# Patient Record
Sex: FEMALE | Race: BLACK OR AFRICAN AMERICAN | NOT HISPANIC OR LATINO | Employment: FULL TIME | URBAN - METROPOLITAN AREA
[De-identification: names, ages, dates, MRNs, and addresses within clinical notes are randomized per-mention and may not be internally consistent; named-entity substitution may affect disease eponyms.]

---

## 2017-03-06 ENCOUNTER — OFFICE VISIT (OUTPATIENT)
Dept: OBSTETRICS AND GYNECOLOGY | Facility: CLINIC | Age: 27
End: 2017-03-06
Payer: COMMERCIAL

## 2017-03-06 VITALS
HEIGHT: 68 IN | DIASTOLIC BLOOD PRESSURE: 78 MMHG | WEIGHT: 228.38 LBS | SYSTOLIC BLOOD PRESSURE: 118 MMHG | BODY MASS INDEX: 34.61 KG/M2

## 2017-03-06 DIAGNOSIS — R10.31 ABDOMINAL CRAMPING, BILATERAL LOWER QUADRANT: ICD-10-CM

## 2017-03-06 DIAGNOSIS — R10.32 ABDOMINAL CRAMPING, BILATERAL LOWER QUADRANT: ICD-10-CM

## 2017-03-06 DIAGNOSIS — R10.30 ABDOMINAL PAIN, LOWER: Primary | ICD-10-CM

## 2017-03-06 PROCEDURE — 99999 PR PBB SHADOW E&M-EST. PATIENT-LVL III: CPT | Mod: PBBFAC,,, | Performed by: ADVANCED PRACTICE MIDWIFE

## 2017-03-06 PROCEDURE — 99213 OFFICE O/P EST LOW 20 MIN: CPT | Mod: S$GLB,,, | Performed by: ADVANCED PRACTICE MIDWIFE

## 2017-03-06 PROCEDURE — 1160F RVW MEDS BY RX/DR IN RCRD: CPT | Mod: S$GLB,,, | Performed by: ADVANCED PRACTICE MIDWIFE

## 2017-03-06 NOTE — PROGRESS NOTES
"Subjective:       Yee Adler is a 26 y.o. female who presents for evaluation of abdominal pain. Onset was 1 week ago worse last evening- no meds taken. This am intermittent cramping. Symptoms have been intermittent. The pain is described as cramping, sharp and cold, and is 7/10 in intensity when it occurs- no meds taken as she likes to avoid whist nursing. Pain is located in the right and central abdomen- achy- "like period coming". No localized tenderness without radiation.  Aggravating factors: none.  Alleviating factors: liquids. Associated symptoms: soft stool with isolated episode of blood in stool 3 days , resolve spontaneously. The patient denies anorexia, chills, constipation, dysuria, fever, flatus, frequency, hematuria, nausea, sweats and vomiting.      On further questioning, of note, breast feeding 8 month baby, was vegan but started meat after birth and reintroduced seafood this past month  Review of Systems  Pertinent items are noted in HPI.       Objective:      No exam performed today, N/A.    Abdomen- Soft and non-tender  Assessment:      Abdominal pain, likely secondary to reintro  Isolated episode "blood in stool" after introduction of meat/seafood in diet after being vegan .      Plan:      The diagnosis was discussed with the patient and evaluation and treatment plans outlined.  Reassured patient that symptoms are almost certainly benign and self-resolving.  Follow up as needed.    Advise to reintroduce seafood slowly and one at a time  Take probiotics  Watch for any further bloody stool and FU with GI  Pt happy with this discussion and will FU prn  "

## 2018-05-15 ENCOUNTER — PATIENT OUTREACH (OUTPATIENT)
Dept: ADMINISTRATIVE | Facility: HOSPITAL | Age: 28
End: 2018-05-15

## 2018-05-29 ENCOUNTER — PATIENT OUTREACH (OUTPATIENT)
Dept: ADMINISTRATIVE | Facility: HOSPITAL | Age: 28
End: 2018-05-29

## 2018-06-04 NOTE — PROGRESS NOTES
2nd attempt: I have attempted without success to contact pt to schedule appointment. Left voice mail.

## 2022-06-14 ENCOUNTER — TELEPHONE (OUTPATIENT)
Dept: OBSTETRICS AND GYNECOLOGY | Facility: CLINIC | Age: 32
End: 2022-06-14
Payer: COMMERCIAL

## 2022-06-14 NOTE — TELEPHONE ENCOUNTER
----- Message from Zuleyma Brownlee sent at 6/14/2022 11:55 AM CDT -----  States she would like to schedule a new pregnancy appt. Nothing coming up on the schedule. She would like to schedule w/ Ms Mounika Sofia. Please call pt [phone]. Thank you

## 2022-06-14 NOTE — TELEPHONE ENCOUNTER
Spoke with pt and assisted her with scheduling a pregnancy confirmation. Scheduled pt with Mounika Sofia on 6/28 at 10 am . Pt verbalized understanding and agreed to date, time, and location.

## 2022-06-14 NOTE — TELEPHONE ENCOUNTER
----- Message from Nella Ann sent at 6/14/2022  1:17 PM CDT -----  Regarding: rtn toby  Contact: pt  Returning call to office. Can be reached at  [phone]

## 2022-06-17 ENCOUNTER — HOSPITAL ENCOUNTER (EMERGENCY)
Facility: HOSPITAL | Age: 32
Discharge: HOME | End: 2022-06-17
Attending: EMERGENCY MEDICINE
Payer: COMMERCIAL

## 2022-06-17 ENCOUNTER — TELEPHONE (OUTPATIENT)
Dept: OBSTETRICS AND GYNECOLOGY | Facility: CLINIC | Age: 32
End: 2022-06-17
Payer: COMMERCIAL

## 2022-06-17 VITALS
OXYGEN SATURATION: 100 % | WEIGHT: 182.44 LBS | TEMPERATURE: 98 F | SYSTOLIC BLOOD PRESSURE: 115 MMHG | RESPIRATION RATE: 18 BRPM | HEART RATE: 95 BPM | BODY MASS INDEX: 27.74 KG/M2 | DIASTOLIC BLOOD PRESSURE: 86 MMHG

## 2022-06-17 DIAGNOSIS — R10.30 LOWER ABDOMINAL PAIN: ICD-10-CM

## 2022-06-17 DIAGNOSIS — O26.891 ABDOMINAL PAIN DURING PREGNANCY IN FIRST TRIMESTER: Primary | ICD-10-CM

## 2022-06-17 DIAGNOSIS — R10.9 ABDOMINAL PAIN DURING PREGNANCY IN FIRST TRIMESTER: Primary | ICD-10-CM

## 2022-06-17 LAB
AMORPH CRY URNS QL MICRO: NORMAL
BACTERIA #/AREA URNS HPF: NORMAL /HPF
BILIRUB UR QL STRIP: NEGATIVE
CLARITY UR: CLEAR
COLOR UR: YELLOW
GLUCOSE UR QL STRIP: NEGATIVE
HCG INTACT+B SERPL-ACNC: 2244 MIU/ML
HGB UR QL STRIP: NEGATIVE
KETONES UR QL STRIP: NEGATIVE
LEUKOCYTE ESTERASE UR QL STRIP: ABNORMAL
MICROSCOPIC COMMENT: NORMAL
NITRITE UR QL STRIP: NEGATIVE
PH UR STRIP: 7 [PH] (ref 5–8)
PROT UR QL STRIP: NEGATIVE
SP GR UR STRIP: <=1.005 (ref 1–1.03)
SQUAMOUS #/AREA URNS HPF: 10 /HPF
URN SPEC COLLECT METH UR: ABNORMAL
UROBILINOGEN UR STRIP-ACNC: NEGATIVE EU/DL
WBC #/AREA URNS HPF: 4 /HPF (ref 0–5)

## 2022-06-17 PROCEDURE — 84702 CHORIONIC GONADOTROPIN TEST: CPT | Performed by: EMERGENCY MEDICINE

## 2022-06-17 PROCEDURE — 99284 EMERGENCY DEPT VISIT MOD MDM: CPT | Mod: 25

## 2022-06-17 PROCEDURE — 81000 URINALYSIS NONAUTO W/SCOPE: CPT | Performed by: EMERGENCY MEDICINE

## 2022-06-17 NOTE — ED PROVIDER NOTES
"SCRIBE #1 NOTE: I, Kylah Dowell, am scribing for, and in the presence of, Elayne Frye MD. I have scribed the entire note.       History     Chief Complaint   Patient presents with    Abdominal Cramping     Reports 5 weeks pregnant and had "sharp pain in left side of stomach, radiated to neck and back down to feet". Reports x 2 nights.      Review of patient's allergies indicates:  No Known Allergies      History of Present Illness     HPI    2022, 1:11 PM  History obtained from the patient      History of Present Illness: Yee Adler is a 32 y.o. female patient with a PMHx of anemia who presents to the Emergency Department for evaluation of abdominal cramping which onset 2 days PTA. Symptoms are constant and moderate in severity. No mitigating or exacerbating factors reported. Pt is currently 5 weeks pregnant; A0. Pt states that the pain starts out as cramps on the left side, but radiates to neck and back down to feet.  No associated sxs reported. Patient denies any vaginal bleeding, fever, n/v/d, SOB, CP, HA, and all other sxs at this time. No prior Tx reported. No further complaints or concerns at this time.       Arrival mode: Personal vehicle    PCP: Primary Doctor No        Past Medical History:  Past Medical History:   Diagnosis Date    Anemia 2014       Past Surgical History:  Past Surgical History:   Procedure Laterality Date     SECTION      WISDOM TOOTH EXTRACTION           Family History:  Family History   Problem Relation Age of Onset    Stroke Maternal Grandmother     Diabetes Mother     Hypertension Mother     Breast cancer Neg Hx     Cancer Neg Hx     Colon cancer Neg Hx     Eclampsia Neg Hx     Miscarriages / Stillbirths Neg Hx     Ovarian cancer Neg Hx      labor Neg Hx        Social History:  Social History     Tobacco Use    Smoking status: Never Smoker    Smokeless tobacco: Never Used   Substance and Sexual Activity    Alcohol use: No "    Drug use: No    Sexual activity: Yes     Partners: Male     Birth control/protection: None        Review of Systems     Review of Systems   Constitutional: Negative for fever.   HENT: Negative for sore throat.    Respiratory: Negative for shortness of breath.    Cardiovascular: Negative for chest pain.   Gastrointestinal: Positive for abdominal pain (cramping). Negative for diarrhea, nausea and vomiting.   Genitourinary: Negative for dysuria and vaginal bleeding.   Musculoskeletal: Negative for back pain.   Skin: Negative for rash.   Neurological: Negative for weakness and headaches.   Hematological: Does not bruise/bleed easily.   All other systems reviewed and are negative.       Physical Exam     Initial Vitals [06/17/22 1202]   BP Pulse Resp Temp SpO2   128/73 71 16 98.1 °F (36.7 °C) 100 %      MAP       --          Physical Exam  Nursing Notes and Vital Signs Reviewed.  Constitutional: Patient is in no acute distress. Well-developed and well-nourished.  Head: Atraumatic. Normocephalic.  Eyes: PERRL. EOM intact. Conjunctivae are not pale. No scleral icterus.  ENT: Mucous membranes are moist. Oropharynx is clear and symmetric.    Neck: Supple. Full ROM. No lymphadenopathy.  Cardiovascular: Regular rate. Regular rhythm. No murmurs, rubs, or gallops. Distal pulses are 2+ and symmetric.  Pulmonary/Chest: No respiratory distress. Clear to auscultation bilaterally. No wheezing or rales.  Abdominal: Soft and non-distended.  There is no tenderness.  No rebound, guarding, or rigidity. Good bowel sounds.  Genitourinary: No CVA tenderness  Musculoskeletal: Moves all extremities. No obvious deformities. No edema. No calf tenderness.  Skin: Warm and dry.  Neurological:  Alert, awake, and appropriate.  Normal speech.  No acute focal neurological deficits are appreciated.  Psychiatric: Normal affect. Good eye contact. Appropriate in content.     ED Course   Procedures  ED Vital Signs:  Vitals:    06/17/22 1202 06/17/22  1303 06/17/22 1448   BP: 128/73 110/75 115/86   Pulse: 71 94 95   Resp: 16 18 18   Temp: 98.1 °F (36.7 °C) 98.6 °F (37 °C) 98.4 °F (36.9 °C)   TempSrc: Oral Oral Oral   SpO2: 100% 100% 100%   Weight: 82.7 kg (182 lb 6.9 oz)         Abnormal Lab Results:  Labs Reviewed   URINALYSIS, REFLEX TO URINE CULTURE - Abnormal; Notable for the following components:       Result Value    Specific Gravity, UA <=1.005 (*)     Leukocytes, UA 3+ (*)     All other components within normal limits    Narrative:     Specimen Source->Urine   HCG, QUANTITATIVE    Narrative:     Release to patient->Immediate   URINALYSIS MICROSCOPIC    Narrative:     Specimen Source->Urine        All Lab Results:  Results for orders placed or performed during the hospital encounter of 06/17/22   hCG, quantitative, pregnancy   Result Value Ref Range    HCG Quant 2244 See Text mIU/mL   Urinalysis, Reflex to Urine Culture Urine, Clean Catch    Specimen: Urine   Result Value Ref Range    Specimen UA Urine, Clean Catch     Color, UA Yellow Yellow, Straw, Yee    Appearance, UA Clear Clear    pH, UA 7.0 5.0 - 8.0    Specific Gravity, UA <=1.005 (A) 1.005 - 1.030    Protein, UA Negative Negative    Glucose, UA Negative Negative    Ketones, UA Negative Negative    Bilirubin (UA) Negative Negative    Occult Blood UA Negative Negative    Nitrite, UA Negative Negative    Urobilinogen, UA Negative <2.0 EU/dL    Leukocytes, UA 3+ (A) Negative   Urinalysis Microscopic   Result Value Ref Range    WBC, UA 4 0 - 5 /hpf    Bacteria Occasional None-Occ /hpf    Squam Epithel, UA 10 /hpf    Amorphous, UA Few None-Moderate    Microscopic Comment SEE COMMENT        Imaging Results:  Imaging Results          US OB <14 Wks TransAbd & TransVag, Single Gestation (XPD) (Final result)  Result time 06/17/22 15:39:29   Procedure changed from US OB <14 Wks, TransAbd, Single Gestation     Final result by Chris Emerson MD (06/17/22 15:39:29)                 Impression:      Suspect  early IUP which is below threshold for detection.  No fetal pole identified at this time.  Recommend follow-up ultrasound and serial beta HCG.      Electronically signed by: Chris Emerson MD  Date:    06/17/2022  Time:    15:39             Narrative:    EXAMINATION:  US OB <14 WEEKS, TRANSABDOM & TRANSVAG, SINGLE GESTATION (XPD)    CLINICAL HISTORY:  Lower abdominal pain, unspecifiedpain LLQ;    COMPARISON:  None    FINDINGS:  Beta HCG 2244    The uterus demonstrates a gestational sac with a mean sac diameter of 5.1 mm.  No fetal pole.  Cervix is closed.    The uterus measures 8.3 x 4.8 x 6.6 cm.    Right ovary measures screw 3.6 x 2.5 x 3 cm and is unremarkable.  Left ovary measures 3.4 x 1.2 x 2.4 cm and is unremarkable.                                         The Emergency Provider reviewed the vital signs and test results, which are outlined above.     ED Discussion       3:37 PM: Reassessed pt at this time. Discussed with pt all pertinent ED information and results. Discussed pt dx and plan of tx. Gave pt all f/u and return to the ED instructions. All questions and concerns were addressed at this time. Pt expresses understanding of information and instructions, and is comfortable with plan to discharge. Pt is stable for discharge.    I discussed with patient and/or family/caretaker that evaluation in the ED does not suggest any emergent or life threatening medical conditions requiring immediate intervention beyond what was provided in the ED, and I believe patient is safe for discharge.  Regardless, an unremarkable evaluation in the ED does not preclude the development or presence of a serious of life threatening condition. As such, patient was instructed to return immediately for any worsening or change in current symptoms.       Medical Decision Making:   Clinical Tests:   Lab Tests: Ordered and Reviewed  Radiological Study: Ordered and Reviewed           ED Medication(s):  Medications - No data to  display    Discharge Medication List as of 6/17/2022  3:37 PM           Follow-up Information     [facility]. Schedule an appointment as soon as possible for a visit in 2 days.    Specialty: Obstetrics and Gynecology  Why: Return to the Emergency Room, If symptoms worsen  Contact information:  [address] [facility] [address] [phone]  Additional information:  Please take [address] for the Medical Office Building. Check in on the 3rd floor, to the right.                           Scribe Attestation:   Scribe #1: I performed the above scribed service and the documentation accurately describes the services I performed. I attest to the accuracy of the note.     Attending:   Physician Attestation Statement for Scribe #1: I, Elayne Frye MD, personally performed the services described in this documentation, as scribed by Kylah Dowell, in my presence, and it is both accurate and complete.           Clinical Impression       ICD-10-CM ICD-9-CM   1. Abdominal pain during pregnancy in first trimester  O26.891 646.83    R10.9 789.00   2. Lower abdominal pain  R10.30 789.09       Disposition:   Disposition: Discharged  Condition: Stable       Elayne Frye MD  06/17/22 8010

## 2022-06-17 NOTE — TELEPHONE ENCOUNTER
Returned call to pt who states she's experiencing    R sided (sharp) pains that migrates to her neck, along with anal pain. Pt says she's 5 weeks pregnant and very concerned. Pt drinks 70 oz of water a day. Pt requesting a visit. Advised to report to ER for further evaluation and rule out ectopic. Pt was able to speak with NPTRACE who provided recommendations for comfort but also suggested to visit the ER to be safe because her pregnancy has not been confirmed.       Pt voice understanding.

## 2022-06-17 NOTE — TELEPHONE ENCOUNTER
----- Message from Josefina Cuadra sent at 6/17/2022 10:57 AM CDT -----  Contact: Oli Fraire@ [phone]  Pt calling to see if she able to take the 1 pm appointment today with the NP provider? Please call to advise.

## 2022-07-06 ENCOUNTER — OFFICE VISIT (OUTPATIENT)
Dept: OBSTETRICS AND GYNECOLOGY | Facility: CLINIC | Age: 32
End: 2022-07-06
Payer: COMMERCIAL

## 2022-07-06 ENCOUNTER — LAB VISIT (OUTPATIENT)
Dept: LAB | Facility: HOSPITAL | Age: 32
End: 2022-07-06
Attending: ADVANCED PRACTICE MIDWIFE
Payer: COMMERCIAL

## 2022-07-06 VITALS
SYSTOLIC BLOOD PRESSURE: 116 MMHG | HEIGHT: 68 IN | DIASTOLIC BLOOD PRESSURE: 60 MMHG | BODY MASS INDEX: 27.92 KG/M2 | WEIGHT: 184.19 LBS

## 2022-07-06 DIAGNOSIS — Z34.80 SUPERVISION OF OTHER NORMAL PREGNANCY: ICD-10-CM

## 2022-07-06 DIAGNOSIS — Z34.80 SUPERVISION OF OTHER NORMAL PREGNANCY: Primary | ICD-10-CM

## 2022-07-06 LAB
BACTERIA #/AREA URNS AUTO: NORMAL /HPF
BASOPHILS # BLD AUTO: 0.02 K/UL (ref 0–0.2)
BASOPHILS NFR BLD: 0.4 % (ref 0–1.9)
BILIRUB UR QL STRIP: NEGATIVE
CLARITY UR REFRACT.AUTO: ABNORMAL
COLOR UR AUTO: YELLOW
DIFFERENTIAL METHOD: ABNORMAL
EOSINOPHIL # BLD AUTO: 0 K/UL (ref 0–0.5)
EOSINOPHIL NFR BLD: 0.6 % (ref 0–8)
ERYTHROCYTE [DISTWIDTH] IN BLOOD BY AUTOMATED COUNT: 15.3 % (ref 11.5–14.5)
GLUCOSE UR QL STRIP: NEGATIVE
HCT VFR BLD AUTO: 33.1 % (ref 37–48.5)
HGB BLD-MCNC: 10.3 G/DL (ref 12–16)
HGB UR QL STRIP: NEGATIVE
IMM GRANULOCYTES # BLD AUTO: 0.01 K/UL (ref 0–0.04)
IMM GRANULOCYTES NFR BLD AUTO: 0.2 % (ref 0–0.5)
KETONES UR QL STRIP: NEGATIVE
LEUKOCYTE ESTERASE UR QL STRIP: ABNORMAL
LYMPHOCYTES # BLD AUTO: 1.4 K/UL (ref 1–4.8)
LYMPHOCYTES NFR BLD: 28.5 % (ref 18–48)
MCH RBC QN AUTO: 23.4 PG (ref 27–31)
MCHC RBC AUTO-ENTMCNC: 31.1 G/DL (ref 32–36)
MCV RBC AUTO: 75 FL (ref 82–98)
MICROSCOPIC COMMENT: NORMAL
MONOCYTES # BLD AUTO: 0.3 K/UL (ref 0.3–1)
MONOCYTES NFR BLD: 5.8 % (ref 4–15)
NEUTROPHILS # BLD AUTO: 3.2 K/UL (ref 1.8–7.7)
NEUTROPHILS NFR BLD: 64.5 % (ref 38–73)
NITRITE UR QL STRIP: NEGATIVE
NRBC BLD-RTO: 0 /100 WBC
PH UR STRIP: 8 [PH] (ref 5–8)
PLATELET # BLD AUTO: 283 K/UL (ref 150–450)
PMV BLD AUTO: 11.2 FL (ref 9.2–12.9)
PROT UR QL STRIP: NEGATIVE
RBC # BLD AUTO: 4.4 M/UL (ref 4–5.4)
RBC #/AREA URNS AUTO: 4 /HPF (ref 0–4)
SP GR UR STRIP: 1 (ref 1–1.03)
SQUAMOUS #/AREA URNS AUTO: 14 /HPF
URN SPEC COLLECT METH UR: ABNORMAL
WBC # BLD AUTO: 5.02 K/UL (ref 3.9–12.7)
WBC #/AREA URNS AUTO: 2 /HPF (ref 0–5)

## 2022-07-06 PROCEDURE — 86592 SYPHILIS TEST NON-TREP QUAL: CPT | Performed by: ADVANCED PRACTICE MIDWIFE

## 2022-07-06 PROCEDURE — 99203 PR OFFICE/OUTPT VISIT, NEW, LEVL III, 30-44 MIN: ICD-10-PCS | Mod: S$GLB,,, | Performed by: ADVANCED PRACTICE MIDWIFE

## 2022-07-06 PROCEDURE — 1159F PR MEDICATION LIST DOCUMENTED IN MEDICAL RECORD: ICD-10-PCS | Mod: CPTII,S$GLB,, | Performed by: ADVANCED PRACTICE MIDWIFE

## 2022-07-06 PROCEDURE — 99999 PR PBB SHADOW E&M-EST. PATIENT-LVL III: ICD-10-PCS | Mod: PBBFAC,,, | Performed by: ADVANCED PRACTICE MIDWIFE

## 2022-07-06 PROCEDURE — 99203 OFFICE O/P NEW LOW 30 MIN: CPT | Mod: S$GLB,,, | Performed by: ADVANCED PRACTICE MIDWIFE

## 2022-07-06 PROCEDURE — 3078F DIAST BP <80 MM HG: CPT | Mod: CPTII,S$GLB,, | Performed by: ADVANCED PRACTICE MIDWIFE

## 2022-07-06 PROCEDURE — 3008F BODY MASS INDEX DOCD: CPT | Mod: CPTII,S$GLB,, | Performed by: ADVANCED PRACTICE MIDWIFE

## 2022-07-06 PROCEDURE — 86762 RUBELLA ANTIBODY: CPT | Performed by: ADVANCED PRACTICE MIDWIFE

## 2022-07-06 PROCEDURE — 99999 PR PBB SHADOW E&M-EST. PATIENT-LVL III: CPT | Mod: PBBFAC,,, | Performed by: ADVANCED PRACTICE MIDWIFE

## 2022-07-06 PROCEDURE — 3078F PR MOST RECENT DIASTOLIC BLOOD PRESSURE < 80 MM HG: ICD-10-PCS | Mod: CPTII,S$GLB,, | Performed by: ADVANCED PRACTICE MIDWIFE

## 2022-07-06 PROCEDURE — 87624 HPV HI-RISK TYP POOLED RSLT: CPT | Performed by: ADVANCED PRACTICE MIDWIFE

## 2022-07-06 PROCEDURE — 87491 CHLMYD TRACH DNA AMP PROBE: CPT | Performed by: ADVANCED PRACTICE MIDWIFE

## 2022-07-06 PROCEDURE — 87389 HIV-1 AG W/HIV-1&-2 AB AG IA: CPT | Performed by: ADVANCED PRACTICE MIDWIFE

## 2022-07-06 PROCEDURE — 88175 CYTOPATH C/V AUTO FLUID REDO: CPT | Performed by: ADVANCED PRACTICE MIDWIFE

## 2022-07-06 PROCEDURE — 1159F MED LIST DOCD IN RCRD: CPT | Mod: CPTII,S$GLB,, | Performed by: ADVANCED PRACTICE MIDWIFE

## 2022-07-06 PROCEDURE — 36415 COLL VENOUS BLD VENIPUNCTURE: CPT | Mod: PN | Performed by: ADVANCED PRACTICE MIDWIFE

## 2022-07-06 PROCEDURE — 3008F PR BODY MASS INDEX (BMI) DOCUMENTED: ICD-10-PCS | Mod: CPTII,S$GLB,, | Performed by: ADVANCED PRACTICE MIDWIFE

## 2022-07-06 PROCEDURE — 81001 URINALYSIS AUTO W/SCOPE: CPT | Performed by: ADVANCED PRACTICE MIDWIFE

## 2022-07-06 PROCEDURE — 86901 BLOOD TYPING SEROLOGIC RH(D): CPT | Performed by: ADVANCED PRACTICE MIDWIFE

## 2022-07-06 PROCEDURE — 87591 N.GONORRHOEAE DNA AMP PROB: CPT | Performed by: ADVANCED PRACTICE MIDWIFE

## 2022-07-06 PROCEDURE — 85025 COMPLETE CBC W/AUTO DIFF WBC: CPT | Performed by: ADVANCED PRACTICE MIDWIFE

## 2022-07-06 PROCEDURE — 3074F PR MOST RECENT SYSTOLIC BLOOD PRESSURE < 130 MM HG: ICD-10-PCS | Mod: CPTII,S$GLB,, | Performed by: ADVANCED PRACTICE MIDWIFE

## 2022-07-06 PROCEDURE — 80074 ACUTE HEPATITIS PANEL: CPT | Performed by: ADVANCED PRACTICE MIDWIFE

## 2022-07-06 PROCEDURE — 3074F SYST BP LT 130 MM HG: CPT | Mod: CPTII,S$GLB,, | Performed by: ADVANCED PRACTICE MIDWIFE

## 2022-07-06 NOTE — PROGRESS NOTES
"CC: Absence of menses risk assessment Mounika Sofia 2022    Yee Adler is a 32 y.o. female  presents with complaint of absence of menstruation.  She reports nausea UPT is positive.    *  LMP 2022, EDC 2023, therefore 7 weeks 1 day    Past Medical History:   Diagnosis Date    Anemia 2014     Past Surgical History:   Procedure Laterality Date     SECTION      WISDOM TOOTH EXTRACTION       Social History     Socioeconomic History    Marital status: Single   Tobacco Use    Smoking status: Never Smoker    Smokeless tobacco: Never Used   Substance and Sexual Activity    Alcohol use: No    Drug use: No    Sexual activity: Yes     Partners: Male     Birth control/protection: None     Family History   Problem Relation Age of Onset    Stroke Maternal Grandmother     Diabetes Mother     Hypertension Mother     Breast cancer Neg Hx     Cancer Neg Hx     Colon cancer Neg Hx     Eclampsia Neg Hx     Miscarriages / Stillbirths Neg Hx     Ovarian cancer Neg Hx      labor Neg Hx      OB History    Para Term  AB Living   3 2 2     2   SAB IAB Ectopic Multiple Live Births         0 2      # Outcome Date GA Lbr Jose G/2nd Weight Sex Delivery Anes PTL Lv   3 Current            2 Term 16 41w3d  3.13 kg (6 lb 14.4 oz) M Vag-Spont None N NILO   1 Term 10/06/14 41w0d  3.419 kg (7 lb 8.6 oz) M CS-LTranv Spinal N NILO       /60   Ht 5' 8" (1.727 m)   Wt 83.6 kg (184 lb 3.1 oz)   LMP 2022 (Exact Date)   BMI 28.01 kg/m²         ROS:  GENERAL: Denies weight gain or weight loss. Feeling well overall.   SKIN: Denies rash or lesions.   HEAD: Denies head injury or headache.   NODES: Denies enlarged lymph nodes.   CHEST: Denies chest pain or shortness of breath.   CARDIOVASCULAR: Denies palpitations or left sided chest pain.   ABDOMEN: No abdominal pain, constipation, diarrhea, nausea, vomiting or rectal bleeding.   URINARY: No frequency, dysuria, " hematuria, or burning on urination.  REPRODUCTIVE: See HPI.   BREASTS: The patient performs breast self-examination and denies pain, lumps, or nipple discharge.   HEMATOLOGIC: No easy bruisability or excessive bleeding.   MUSCULOSKELETAL: Denies joint pain or swelling.   NEUROLOGIC: Denies syncope or weakness.   PSYCHIATRIC: Denies depression, anxiety or mood swings.    PE:   APPEARANCE: Well nourished, well developed, in no acute distress.  AFFECT: WNL, alert and oriented x 3.  SKIN: No acne or hirsutism.  NECK: Neck symmetric without masses or thyromegaly.  NODES: No inguinal, cervical, axillary or femoral lymph node enlargement.  CHEST: Good respiratory effort.   ABDOMEN: Soft. No tenderness or masses. No hepatosplenomegaly. No hernias.  BREASTS: Symmetrical, no skin changes or visible lesions. No palpable masses, nipple discharge bilaterally.  PELVIC: Normal external female genitalia without lesions. Normal hair distribution. Adequate perineal body, normal urethral meatus. Vagina moist and well rugated without lesions or discharge. Cervix pink, without lesions, discharge or tenderness. No significant cystocele or rectocele. Bimanual exam shows uterus is 8 weeks, regular, mobile and nontender. Adnexa without masses or tenderness.  EXTREMITIES: No edema.          ASSESSMENT and PLAN:  32-year-old  3 para 2 with secondary amenorrhea and positive UPT.  LMP 2022 yielded EDC 2023, therefore 7 weeks 1 day.  Prenatal lab today.  GC chlamydia and Pap today.  Taking prenatal vitamins.  Secondary to left-sided pain on 2022, went to ED and had an ultrasound to rule out ectopic.  Early IUP with gestational sac, no fetal pole.  Ovaries adnexa unremarkable.  Ectopic ruled out.  Beta hCG 2244.  Dating ultrasound and new OB 1 week.  First trimester expectations-information provided.  Miscarriage precautions reviewed.    ICD-10-CM ICD-9-CM    1. Supervision of other normal pregnancy  Z34.80 V22.1 HIV 1/2  Ag/Ab (4th Gen)      RPR      Rubella Antibody, IgG      Type & Screen      CBC Auto Differential      C. trachomatis/N. gonorrhoeae by AMP DNA      Liquid-Based Pap Smear, Screening      HPV High Risk Genotypes, PCR      Hepatitis Panel, Acute      Urinalysis, Reflex to Urine Culture Urine, Clean Catch      US OB/GYN Procedure ([facility])         Patient was counseled today on proper weight gain based on the [place] [employer]'s recommendations based on her pre-pregnancy weight. Discussed foods to avoid in pregnancy (i.e. sushi, fish that are high in mercury, deli meat, and unpasteurized cheeses). Discussed prenatal vitamin options (i.e. stool softener, DHA). Contingency screen offered - patient desires.    No follow-ups on file.

## 2022-07-06 NOTE — PATIENT INSTRUCTIONS
Patient Education       Pregnancy - The Second Month   About this topic   It is important for you to learn how to take care of yourself to help you have a healthy baby and safe delivery. It is good to have health care throughout your pregnancy.  The second month of your pregnancy starts around week 5 and lasts through week 9. By knowing how far along you are, you can learn what is normal for this stage of your pregnancy and plan for what is next.  General   Your Body   During the second month of your pregnancy, here are some things you can expect.  There are more hormones active in your body and because of them, you may:  Have morning sickness or have an upset stomach at other times throughout the day  Feel just a little tired or need long naps each day  Go to the bathroom more often, even at night  Have tender or swollen breasts  Feel more emotional  Have color changes in nipples and areola  Have a brownish color over your forehead, temples, cheeks, and/or lips. This is melasma.  Your babys growth and development:  Your baby starts to develop organs like the brain, heart, liver, and lungs. Arms, legs, eyes, and ears are all starting to grow.  The heart beats about 150 times each minute by 6 weeks. Your doctor may be able to hear the heartbeat with a special tool by the end of this month.  Your baby has facial features, moves, and wiggles, but you won't be able to feel anything yet.  Your baby is about 1 inch (2.5 cm) long and is about the size of a blueberry.  Things to Think About   Avoid alcohol, drugs, tobacco products, and second hand smoke  Check with your doctor before taking any kind of drugs.  Ask about taking a vitamin. Take at least 400 micrograms of folic acid each day to help prevent some birth defects.  Making appointments with the doctor who will take care of you and your baby while you are pregnant.  Learn about what kinds of screening tests are offered while you are pregnant.  Is there anything I  need to do more or less of while I am pregnant?  Learn about which over the counter products are safe to use and take while you are pregnant.  Be sure to eat fresh, healthy foods while you are pregnant. Learn what foods are more likely to make you sick or have things that could harm your baby.  When do I need to call the doctor?   Not able to stop throwing up  Belly pain or cramps that keeps you from eating or sleeping  Period-like bleeding  Where can I learn more?   American Academy of Family Physicians  https://familydoctor.org/changes-in-your-body-during-pregnancy-first-trimester/   American Pregnancy Association  https://americanpregnancy.org/planning/first-prenatal-visit/   Last Reviewed Date   2020-04-20  Consumer Information Use and Disclaimer   This information is not specific medical advice and does not replace information you receive from your health care provider. This is only a brief summary of general information. It does NOT include all information about conditions, illnesses, injuries, tests, procedures, treatments, therapies, discharge instructions or life-style choices that may apply to you. You must talk with your health care provider for complete information about your health and treatment options. This information should not be used to decide whether or not to accept your health care providers advice, instructions or recommendations. Only your health care provider has the knowledge and training to provide advice that is right for you.  Copyright   Copyright © 2021 [employer] and its affiliates and/or licensors. All rights reserved.

## 2022-07-07 LAB
ABO + RH BLD: NORMAL
BLD GP AB SCN CELLS X3 SERPL QL: NORMAL
HAV IGM SERPL QL IA: NEGATIVE
HBV CORE IGM SERPL QL IA: NEGATIVE
HBV SURFACE AG SERPL QL IA: NEGATIVE
HCV AB SERPL QL IA: NEGATIVE
HIV 1+2 AB+HIV1 P24 AG SERPL QL IA: NEGATIVE
RPR SER QL: NORMAL
RUBV IGG SER-ACNC: 31.1 IU/ML
RUBV IGG SER-IMP: REACTIVE

## 2022-07-09 LAB
C TRACH DNA SPEC QL NAA+PROBE: NOT DETECTED
N GONORRHOEA DNA SPEC QL NAA+PROBE: NOT DETECTED

## 2022-07-12 ENCOUNTER — INITIAL PRENATAL (OUTPATIENT)
Dept: OBSTETRICS AND GYNECOLOGY | Facility: CLINIC | Age: 32
End: 2022-07-12
Payer: COMMERCIAL

## 2022-07-12 ENCOUNTER — PROCEDURE VISIT (OUTPATIENT)
Dept: OBSTETRICS AND GYNECOLOGY | Facility: CLINIC | Age: 32
End: 2022-07-12
Payer: COMMERCIAL

## 2022-07-12 VITALS
BODY MASS INDEX: 28.19 KG/M2 | DIASTOLIC BLOOD PRESSURE: 74 MMHG | WEIGHT: 185.44 LBS | SYSTOLIC BLOOD PRESSURE: 114 MMHG

## 2022-07-12 DIAGNOSIS — Z98.891 HISTORY OF VBAC: ICD-10-CM

## 2022-07-12 DIAGNOSIS — Z34.80 SUPERVISION OF OTHER NORMAL PREGNANCY: Primary | ICD-10-CM

## 2022-07-12 DIAGNOSIS — Z34.80 SUPERVISION OF OTHER NORMAL PREGNANCY: ICD-10-CM

## 2022-07-12 LAB
FINAL PATHOLOGIC DIAGNOSIS: NORMAL
Lab: NORMAL

## 2022-07-12 PROCEDURE — 76817 TRANSVAGINAL US OBSTETRIC: CPT | Mod: S$GLB,,, | Performed by: OBSTETRICS & GYNECOLOGY

## 2022-07-12 PROCEDURE — 99999 PR PBB SHADOW E&M-EST. PATIENT-LVL III: ICD-10-PCS | Mod: PBBFAC,,, | Performed by: ADVANCED PRACTICE MIDWIFE

## 2022-07-12 PROCEDURE — 99999 PR PBB SHADOW E&M-EST. PATIENT-LVL III: CPT | Mod: PBBFAC,,, | Performed by: ADVANCED PRACTICE MIDWIFE

## 2022-07-12 PROCEDURE — 0500F INITIAL PRENATAL CARE VISIT: CPT | Mod: S$GLB,,, | Performed by: ADVANCED PRACTICE MIDWIFE

## 2022-07-12 PROCEDURE — 0500F PR INITIAL PRENATAL CARE VISIT: ICD-10-PCS | Mod: S$GLB,,, | Performed by: ADVANCED PRACTICE MIDWIFE

## 2022-07-12 PROCEDURE — 76817 US OB/GYN PROCEDURE (VIEWPOINT): ICD-10-PCS | Mod: S$GLB,,, | Performed by: OBSTETRICS & GYNECOLOGY

## 2022-07-12 NOTE — PATIENT INSTRUCTIONS
Patient Education       Pregnancy - The Second Month   About this topic   It is important for you to learn how to take care of yourself to help you have a healthy baby and safe delivery. It is good to have health care throughout your pregnancy.  The second month of your pregnancy starts around week 5 and lasts through week 9. By knowing how far along you are, you can learn what is normal for this stage of your pregnancy and plan for what is next.  General   Your Body   During the second month of your pregnancy, here are some things you can expect.  There are more hormones active in your body and because of them, you may:  Have morning sickness or have an upset stomach at other times throughout the day  Feel just a little tired or need long naps each day  Go to the bathroom more often, even at night  Have tender or swollen breasts  Feel more emotional  Have color changes in nipples and areola  Have a brownish color over your forehead, temples, cheeks, and/or lips. This is melasma.  Your babys growth and development:  Your baby starts to develop organs like the brain, heart, liver, and lungs. Arms, legs, eyes, and ears are all starting to grow.  The heart beats about 150 times each minute by 6 weeks. Your doctor may be able to hear the heartbeat with a special tool by the end of this month.  Your baby has facial features, moves, and wiggles, but you won't be able to feel anything yet.  Your baby is about 1 inch (2.5 cm) long and is about the size of a blueberry.  Things to Think About   Avoid alcohol, drugs, tobacco products, and second hand smoke  Check with your doctor before taking any kind of drugs.  Ask about taking a vitamin. Take at least 400 micrograms of folic acid each day to help prevent some birth defects.  Making appointments with the doctor who will take care of you and your baby while you are pregnant.  Learn about what kinds of screening tests are offered while you are pregnant.  Is there anything I  need to do more or less of while I am pregnant?  Learn about which over the counter products are safe to use and take while you are pregnant.  Be sure to eat fresh, healthy foods while you are pregnant. Learn what foods are more likely to make you sick or have things that could harm your baby.  When do I need to call the doctor?   Not able to stop throwing up  Belly pain or cramps that keeps you from eating or sleeping  Period-like bleeding  Where can I learn more?   American Academy of Family Physicians  https://familydoctor.org/changes-in-your-body-during-pregnancy-first-trimester/   American Pregnancy Association  https://americanpregnancy.org/planning/first-prenatal-visit/   Last Reviewed Date   2020-04-20  Consumer Information Use and Disclaimer   This information is not specific medical advice and does not replace information you receive from your health care provider. This is only a brief summary of general information. It does NOT include all information about conditions, illnesses, injuries, tests, procedures, treatments, therapies, discharge instructions or life-style choices that may apply to you. You must talk with your health care provider for complete information about your health and treatment options. This information should not be used to decide whether or not to accept your health care providers advice, instructions or recommendations. Only your health care provider has the knowledge and training to provide advice that is right for you.  Copyright   Copyright © 2021 [employer] and its affiliates and/or licensors. All rights reserved.

## 2022-07-13 LAB
HPV HR 12 DNA SPEC QL NAA+PROBE: NEGATIVE
HPV16 AG SPEC QL: NEGATIVE
HPV18 DNA SPEC QL NAA+PROBE: NEGATIVE

## 2022-07-13 NOTE — PROGRESS NOTES
32 y.o. female  at 8w2d   denies VB or cramping  New OB, consent signed.  Ultrasound today reveals single viable intrauterine pregnancy 8 weeks 1 day yielding EDC 2023-reassuring  History of  section  with successful  in , desires   Doing well without concerns   First trimester s/s improving:    Reviewed prenatal labs  Genetic testing considering options    Reviewed warning signs, pregnancy precautions and how/when to call.  RTC x 4 wks, call or present sooner prn.     I spent a total of 20 minutes on the day of the visit.This includes face to face time and non-face to face time preparing to see the patient (eg, review of tests), Obtaining and/or reviewing separately obtained history, Documenting clinical information in the electronic or other health record, Independently interpreting resultsand communicating results to the patient/family/caregiver, or Care coordination.       CC: Absence of menses risk assessment Mounika Sofia 2022    Yee Adler is a 32 y.o. female  presents with complaint of absence of menstruation.  She reports nausea UPT is positive.    *  LMP 2022, EDC 2023, therefore 7 weeks 1 day    Past Medical History:   Diagnosis Date    Anemia 2014     Past Surgical History:   Procedure Laterality Date     SECTION      WISDOM TOOTH EXTRACTION       Social History     Socioeconomic History    Marital status: Single   Tobacco Use    Smoking status: Never Smoker    Smokeless tobacco: Never Used   Substance and Sexual Activity    Alcohol use: No    Drug use: No    Sexual activity: Yes     Partners: Male     Birth control/protection: None     Family History   Problem Relation Age of Onset    Stroke Maternal Grandmother     Diabetes Mother     Hypertension Mother     Breast cancer Neg Hx     Cancer Neg Hx     Colon cancer Neg Hx     Eclampsia Neg Hx     Miscarriages / Stillbirths Neg Hx     Ovarian cancer  Neg Hx      labor Neg Hx      OB History    Para Term  AB Living   3 2 2     2   SAB IAB Ectopic Multiple Live Births         0 2      # Outcome Date GA Lbr Jose G/2nd Weight Sex Delivery Anes PTL Lv   3 Current            2 Term 16 41w3d  3.13 kg (6 lb 14.4 oz) M Vag-Spont None N NILO   1 Term 10/06/14 41w0d  3.419 kg (7 lb 8.6 oz) M CS-LTranv Spinal N NILO       /74   Wt 84.1 kg (185 lb 6.5 oz)   LMP 2022 (Exact Date)   BMI 28.19 kg/m²         ROS:  GENERAL: Denies weight gain or weight loss. Feeling well overall.   SKIN: Denies rash or lesions.   HEAD: Denies head injury or headache.   NODES: Denies enlarged lymph nodes.   CHEST: Denies chest pain or shortness of breath.   CARDIOVASCULAR: Denies palpitations or left sided chest pain.   ABDOMEN: No abdominal pain, constipation, diarrhea, nausea, vomiting or rectal bleeding.   URINARY: No frequency, dysuria, hematuria, or burning on urination.  REPRODUCTIVE: See HPI.   BREASTS: The patient performs breast self-examination and denies pain, lumps, or nipple discharge.   HEMATOLOGIC: No easy bruisability or excessive bleeding.   MUSCULOSKELETAL: Denies joint pain or swelling.   NEUROLOGIC: Denies syncope or weakness.   PSYCHIATRIC: Denies depression, anxiety or mood swings.    PE:   APPEARANCE: Well nourished, well developed, in no acute distress.  AFFECT: WNL, alert and oriented x 3.  SKIN: No acne or hirsutism.  NECK: Neck symmetric without masses or thyromegaly.  NODES: No inguinal, cervical, axillary or femoral lymph node enlargement.  CHEST: Good respiratory effort.   ABDOMEN: Soft. No tenderness or masses. No hepatosplenomegaly. No hernias.  BREASTS: Symmetrical, no skin changes or visible lesions. No palpable masses, nipple discharge bilaterally.  PELVIC: Normal external female genitalia without lesions. Normal hair distribution. Adequate perineal body, normal urethral meatus. Vagina moist and well rugated without lesions  or discharge. Cervix pink, without lesions, discharge or tenderness. No significant cystocele or rectocele. Bimanual exam shows uterus is 8 weeks, regular, mobile and nontender. Adnexa without masses or tenderness.  EXTREMITIES: No edema.          ASSESSMENT and PLAN:  32-year-old  3 para 2 with secondary amenorrhea and positive UPT.  LMP 2022 yielded EDC 2023, therefore 7 weeks 1 day.  Prenatal lab today.  GC chlamydia and Pap today.  Taking prenatal vitamins.  Secondary to left-sided pain on 2022, went to ED and had an ultrasound to rule out ectopic.  Early IUP with gestational sac, no fetal pole.  Ovaries adnexa unremarkable.  Ectopic ruled out.  Beta hCG 2244.  Dating ultrasound and new OB 1 week.  First trimester expectations-information provided.  Miscarriage precautions reviewed.    ICD-10-CM ICD-9-CM    1. History of   Z98.891 V13.29          Patient was counseled today on proper weight gain based on the [place] [employer]'s recommendations based on her pre-pregnancy weight. Discussed foods to avoid in pregnancy (i.e. sushi, fish that are high in mercury, deli meat, and unpasteurized cheeses). Discussed prenatal vitamin options (i.e. stool softener, DHA). Contingency screen offered - patient desires.    No follow-ups on file.

## 2022-08-08 ENCOUNTER — PATIENT MESSAGE (OUTPATIENT)
Dept: ADMINISTRATIVE | Facility: OTHER | Age: 32
End: 2022-08-08
Payer: COMMERCIAL

## 2022-08-23 ENCOUNTER — ROUTINE PRENATAL (OUTPATIENT)
Dept: OBSTETRICS AND GYNECOLOGY | Facility: CLINIC | Age: 32
End: 2022-08-23
Payer: COMMERCIAL

## 2022-08-23 VITALS
BODY MASS INDEX: 29.08 KG/M2 | SYSTOLIC BLOOD PRESSURE: 126 MMHG | WEIGHT: 191.25 LBS | DIASTOLIC BLOOD PRESSURE: 72 MMHG

## 2022-08-23 DIAGNOSIS — Z98.891 HISTORY OF VBAC: ICD-10-CM

## 2022-08-23 DIAGNOSIS — Z34.80 SUPERVISION OF OTHER NORMAL PREGNANCY: Primary | ICD-10-CM

## 2022-08-23 PROCEDURE — 0502F SUBSEQUENT PRENATAL CARE: CPT | Mod: CPTII,S$GLB,, | Performed by: ADVANCED PRACTICE MIDWIFE

## 2022-08-23 PROCEDURE — 99999 PR PBB SHADOW E&M-EST. PATIENT-LVL III: ICD-10-PCS | Mod: PBBFAC,,, | Performed by: ADVANCED PRACTICE MIDWIFE

## 2022-08-23 PROCEDURE — 0502F PR SUBSEQUENT PRENATAL CARE: ICD-10-PCS | Mod: CPTII,S$GLB,, | Performed by: ADVANCED PRACTICE MIDWIFE

## 2022-08-23 PROCEDURE — 99999 PR PBB SHADOW E&M-EST. PATIENT-LVL III: CPT | Mod: PBBFAC,,, | Performed by: ADVANCED PRACTICE MIDWIFE

## 2022-08-23 RX ORDER — LANOLIN ALCOHOL/MO/W.PET/CERES
1 CREAM (GRAM) TOPICAL
COMMUNITY
End: 2022-09-19

## 2022-08-23 NOTE — PROGRESS NOTES
32 y.o. female  at 14w1d   denies VB or cramping    Doing well without concerns   First trimester s/s improving:     TW lbs   Reviewed prenatal labs  Genetic testing declined      Reviewed warning signs, pregnancy precautions and how/when to call.  RTC x 4 wks, call or present sooner prn.     I spent a total of 20 minutes on the day of the visit.This includes face to face time and non-face to face time preparing to see the patient (eg, review of tests), Obtaining and/or reviewing separately obtained history, Documenting clinical information in the electronic or other health record, Independently interpreting resultsand communicating results to the patient/family/caregiver, or Care coordination.

## 2022-08-23 NOTE — PATIENT INSTRUCTIONS
Patient Education       Pregnancy - The Third Month   About this topic   It is important for you to learn how to take care of yourself to help you have a healthy baby and safe delivery. It is good to have health care throughout your pregnancy.  The third month of your pregnancy starts around week 10 and lasts through week 13. By knowing how far along you are, you can learn what is normal for this stage of your pregnancy and plan for what is next.  General   Your Body   During the third month of your pregnancy, here are some things you can expect.  You may:  Have less morning sickness or upset stomach. This is because the placenta has taken over some of the hormone production for the baby.  Start to gain weight. It is normal to gain about 1 to 3 pounds (.5 to 1.5 kg) in your first 3 months. Most moms gain about 25 to 35 pounds (11 to 16 kg) during their pregnancy. Talk to your doctor about how much weight you should gain.  Have glowing skin because of extra blood flow and hormones  Notice a dark line on your belly. This is normal. You may also be able to feel your uterus in your belly as it starts to grow.  Have more trouble sleeping at night  Have an increase in appetite  Have trouble breathing or have an increase in congestion  Have trouble with bowel movements  Be at a higher risk for getting a yeast infection because of the change in pH of your vagina  Have frequent mood swings.  Your babys growth and development:  Your baby's organs are formed and are starting to work together. Eyelids are closed and will stay that way to protect their eyes as they grow.  Their bones are growing. Your baby is able to open and close their mouth and starts to suck their thumb.  Your baby's genitals and reproductive organs are developing, but it is too soon to tell if your baby is a boy or girl with an ultrasound.  The heartbeat is easy to hear with a special tool at the doctors office.  Your baby is about 3 inches (8 cm) long  and weighs about 1 ounce (30 gm). Your baby is about the size of a lemon.  Things to Think About   Avoid alcohol, drugs, tobacco products, and second hand smoke  Check with your doctor before taking any kind of drugs. Continue to take your vitamin with folic acid.  Eat small meals and drink more water to help with heartburn. Also go for a walk after eating and avoid spicy, fried foods.  You may need to switch to another size or style of clothes as your baby grows. Be comfortable and know that the baby is well cushioned inside of you.  Stay healthy by eating good foods, exercising, and getting enough rest.  When do I need to call the doctor?   Not gaining any weight or losing weight  Belly pain or cramps that keeps you from eating or sleeping  Continuing to have too much upset stomach and throwing up  Period-like bleeding  Where can I learn more?   American Academy of Family Physicians  https://familydoctor.org/changes-in-your-body-during-pregnancy-first-trimester/   Better Health  https://www.betterhealth.[place].gov.au/health/HealthyLiving/pregnancy-stages-and-changes   Last Reviewed Date   2020-04-20  Consumer Information Use and Disclaimer   This information is not specific medical advice and does not replace information you receive from your health care provider. This is only a brief summary of general information. It does NOT include all information about conditions, illnesses, injuries, tests, procedures, treatments, therapies, discharge instructions or life-style choices that may apply to you. You must talk with your health care provider for complete information about your health and treatment options. This information should not be used to decide whether or not to accept your health care providers advice, instructions or recommendations. Only your health care provider has the knowledge and training to provide advice that is right for you.  Copyright   Copyright © 2021 [employer] and its affiliates and/or  licensors. All rights reserved.

## 2022-09-15 ENCOUNTER — ROUTINE PRENATAL (OUTPATIENT)
Dept: OBSTETRICS AND GYNECOLOGY | Facility: CLINIC | Age: 32
End: 2022-09-15
Payer: COMMERCIAL

## 2022-09-15 VITALS — WEIGHT: 194 LBS | DIASTOLIC BLOOD PRESSURE: 80 MMHG | BODY MASS INDEX: 29.5 KG/M2 | SYSTOLIC BLOOD PRESSURE: 102 MMHG

## 2022-09-15 DIAGNOSIS — Z3A.17 17 WEEKS GESTATION OF PREGNANCY: ICD-10-CM

## 2022-09-15 DIAGNOSIS — O26.892 ABDOMINAL PAIN DURING PREGNANCY IN SECOND TRIMESTER: Primary | ICD-10-CM

## 2022-09-15 DIAGNOSIS — Z34.80 SUPERVISION OF OTHER NORMAL PREGNANCY: ICD-10-CM

## 2022-09-15 DIAGNOSIS — R10.9 ABDOMINAL PAIN DURING PREGNANCY IN SECOND TRIMESTER: Primary | ICD-10-CM

## 2022-09-15 PROCEDURE — 99999 PR PBB SHADOW E&M-EST. PATIENT-LVL II: ICD-10-PCS | Mod: PBBFAC,,, | Performed by: MIDWIFE

## 2022-09-15 PROCEDURE — 0502F PR SUBSEQUENT PRENATAL CARE: ICD-10-PCS | Mod: CPTII,S$GLB,, | Performed by: MIDWIFE

## 2022-09-15 PROCEDURE — 0502F SUBSEQUENT PRENATAL CARE: CPT | Mod: CPTII,S$GLB,, | Performed by: MIDWIFE

## 2022-09-15 PROCEDURE — 99999 PR PBB SHADOW E&M-EST. PATIENT-LVL II: CPT | Mod: PBBFAC,,, | Performed by: MIDWIFE

## 2022-09-15 NOTE — PROGRESS NOTES
Pt here for some odd discomforts, some sharp pain that comes from the sides of her abdomen above umbilicus, intermittent, sounds possibly like gas pain, will try Gas X or simethicone, also pt states she has been having periods of dizziness after being up for a few hours, encouraged hydration, frequent small meals to keep blood sugar levels normal, recommended to pt that if she has more frequent episodes to let us know, cardiology consult would be the next step

## 2022-09-16 ENCOUNTER — LAB VISIT (OUTPATIENT)
Dept: LAB | Facility: HOSPITAL | Age: 32
End: 2022-09-16
Attending: MIDWIFE
Payer: COMMERCIAL

## 2022-09-16 ENCOUNTER — ROUTINE PRENATAL (OUTPATIENT)
Dept: OBSTETRICS AND GYNECOLOGY | Facility: CLINIC | Age: 32
End: 2022-09-16
Payer: COMMERCIAL

## 2022-09-16 ENCOUNTER — TELEPHONE (OUTPATIENT)
Dept: OBSTETRICS AND GYNECOLOGY | Facility: CLINIC | Age: 32
End: 2022-09-16
Payer: COMMERCIAL

## 2022-09-16 VITALS
WEIGHT: 193.81 LBS | BODY MASS INDEX: 29.46 KG/M2 | DIASTOLIC BLOOD PRESSURE: 64 MMHG | SYSTOLIC BLOOD PRESSURE: 116 MMHG

## 2022-09-16 DIAGNOSIS — R42 DIZZINESS: Primary | ICD-10-CM

## 2022-09-16 DIAGNOSIS — R42 DIZZINESS: ICD-10-CM

## 2022-09-16 DIAGNOSIS — N89.8 VAGINAL DISCHARGE DURING PREGNANCY IN SECOND TRIMESTER: ICD-10-CM

## 2022-09-16 DIAGNOSIS — O26.892 VAGINAL DISCHARGE DURING PREGNANCY IN SECOND TRIMESTER: ICD-10-CM

## 2022-09-16 PROCEDURE — 99999 PR PBB SHADOW E&M-EST. PATIENT-LVL II: ICD-10-PCS | Mod: PBBFAC,,, | Performed by: MIDWIFE

## 2022-09-16 PROCEDURE — 99999 PR PBB SHADOW E&M-EST. PATIENT-LVL II: CPT | Mod: PBBFAC,,, | Performed by: MIDWIFE

## 2022-09-16 PROCEDURE — 87086 URINE CULTURE/COLONY COUNT: CPT | Performed by: MIDWIFE

## 2022-09-16 PROCEDURE — 80053 COMPREHEN METABOLIC PANEL: CPT | Performed by: MIDWIFE

## 2022-09-16 PROCEDURE — 87481 CANDIDA DNA AMP PROBE: CPT | Mod: 59 | Performed by: MIDWIFE

## 2022-09-16 PROCEDURE — 36415 COLL VENOUS BLD VENIPUNCTURE: CPT | Performed by: MIDWIFE

## 2022-09-16 PROCEDURE — 85025 COMPLETE CBC W/AUTO DIFF WBC: CPT | Performed by: MIDWIFE

## 2022-09-16 PROCEDURE — 0502F SUBSEQUENT PRENATAL CARE: CPT | Mod: CPTII,S$GLB,, | Performed by: MIDWIFE

## 2022-09-16 PROCEDURE — 0502F PR SUBSEQUENT PRENATAL CARE: ICD-10-PCS | Mod: CPTII,S$GLB,, | Performed by: MIDWIFE

## 2022-09-16 NOTE — TELEPHONE ENCOUNTER
Received phone call from patient. Patient c/o sharp pains in abd/lower back area. States that it has been occurring for the last 3 days. Patient denied any vaginal bleeding. After speaking with midwife in the office, advised patient to head to the ER for evaluation. Patient stated that she was trying to avoid going to the ER, because she always receives a high bill, and preferred to be seen in the office. Patient agreed to appt today at the [facility] for 3:45.

## 2022-09-17 LAB
ALBUMIN SERPL BCP-MCNC: 3.2 G/DL (ref 3.5–5.2)
ALP SERPL-CCNC: 50 U/L (ref 55–135)
ALT SERPL W/O P-5'-P-CCNC: 10 U/L (ref 10–44)
ANION GAP SERPL CALC-SCNC: 8 MMOL/L (ref 8–16)
AST SERPL-CCNC: 13 U/L (ref 10–40)
BASOPHILS # BLD AUTO: 0.02 K/UL (ref 0–0.2)
BASOPHILS NFR BLD: 0.3 % (ref 0–1.9)
BILIRUB SERPL-MCNC: 0.1 MG/DL (ref 0.1–1)
BUN SERPL-MCNC: 8 MG/DL (ref 6–20)
CALCIUM SERPL-MCNC: 9 MG/DL (ref 8.7–10.5)
CHLORIDE SERPL-SCNC: 104 MMOL/L (ref 95–110)
CO2 SERPL-SCNC: 24 MMOL/L (ref 23–29)
CREAT SERPL-MCNC: 0.6 MG/DL (ref 0.5–1.4)
DIFFERENTIAL METHOD: ABNORMAL
EOSINOPHIL # BLD AUTO: 0.1 K/UL (ref 0–0.5)
EOSINOPHIL NFR BLD: 0.8 % (ref 0–8)
ERYTHROCYTE [DISTWIDTH] IN BLOOD BY AUTOMATED COUNT: 14.8 % (ref 11.5–14.5)
EST. GFR  (NO RACE VARIABLE): >60 ML/MIN/1.73 M^2
GLUCOSE SERPL-MCNC: 88 MG/DL (ref 70–110)
HCT VFR BLD AUTO: 29.8 % (ref 37–48.5)
HGB BLD-MCNC: 9.3 G/DL (ref 12–16)
IMM GRANULOCYTES # BLD AUTO: 0.02 K/UL (ref 0–0.04)
IMM GRANULOCYTES NFR BLD AUTO: 0.3 % (ref 0–0.5)
LYMPHOCYTES # BLD AUTO: 1.8 K/UL (ref 1–4.8)
LYMPHOCYTES NFR BLD: 27.8 % (ref 18–48)
MCH RBC QN AUTO: 23.8 PG (ref 27–31)
MCHC RBC AUTO-ENTMCNC: 31.2 G/DL (ref 32–36)
MCV RBC AUTO: 76 FL (ref 82–98)
MONOCYTES # BLD AUTO: 0.4 K/UL (ref 0.3–1)
MONOCYTES NFR BLD: 5.5 % (ref 4–15)
NEUTROPHILS # BLD AUTO: 4.2 K/UL (ref 1.8–7.7)
NEUTROPHILS NFR BLD: 65.3 % (ref 38–73)
NRBC BLD-RTO: 0 /100 WBC
PLATELET # BLD AUTO: 246 K/UL (ref 150–450)
PMV BLD AUTO: 12 FL (ref 9.2–12.9)
POTASSIUM SERPL-SCNC: 4.2 MMOL/L (ref 3.5–5.1)
PROT SERPL-MCNC: 6.4 G/DL (ref 6–8.4)
RBC # BLD AUTO: 3.91 M/UL (ref 4–5.4)
SODIUM SERPL-SCNC: 136 MMOL/L (ref 136–145)
WBC # BLD AUTO: 6.4 K/UL (ref 3.9–12.7)

## 2022-09-18 LAB
BACTERIA UR CULT: NORMAL
BACTERIA UR CULT: NORMAL

## 2022-09-19 PROBLEM — O99.012 ANEMIA AFFECTING PREGNANCY IN SECOND TRIMESTER: Status: ACTIVE | Noted: 2022-09-19

## 2022-09-19 RX ORDER — FERROUS SULFATE 325(65) MG
325 TABLET, DELAYED RELEASE (ENTERIC COATED) ORAL 2 TIMES DAILY
Qty: 60 TABLET | Refills: 11 | Status: SHIPPED | OUTPATIENT
Start: 2022-09-19

## 2022-09-19 NOTE — PROGRESS NOTES
"  Patient here with with c/o sharp abdominal pain & dizziness   Description of pain consistent with RLP. Denies LOF or VB. Does report an increase in "clear to white" vaginal discharge, occasional itching.   Speculum exam performed. No obvious abnormal discharge or odor. AFFIRM collected. Some bleeding when cervix wiped with cotton swab (to better visualize os), but cervix is L/T/C. Urine culture collected.   C/o dizziness. Admits to not eating much before lunch at around 1 PM. She is a teacher and very busy. Stressed importance of eating 2 meals/day + protein rich snacks. CBC and CMP today.   Bleeding and pain precautions reviewed  RTC in 3 weeks with anatomy scan, sooner if needed    A. Jak PATEL/ZHENG Sow SNM  "

## 2022-09-21 DIAGNOSIS — B96.89 BV (BACTERIAL VAGINOSIS): Primary | ICD-10-CM

## 2022-09-21 DIAGNOSIS — N76.0 BV (BACTERIAL VAGINOSIS): Primary | ICD-10-CM

## 2022-09-21 LAB
BACTERIAL VAGINOSIS DNA: POSITIVE
CANDIDA GLABRATA DNA: NEGATIVE
CANDIDA KRUSEI DNA: NEGATIVE
CANDIDA RRNA VAG QL PROBE: NEGATIVE
T VAGINALIS RRNA GENITAL QL PROBE: NEGATIVE

## 2022-09-21 RX ORDER — METRONIDAZOLE 500 MG/1
500 TABLET ORAL EVERY 12 HOURS
Qty: 14 TABLET | Refills: 0 | Status: SHIPPED | OUTPATIENT
Start: 2022-09-21 | End: 2022-09-28

## 2022-09-27 ENCOUNTER — PATIENT MESSAGE (OUTPATIENT)
Dept: OBSTETRICS AND GYNECOLOGY | Facility: CLINIC | Age: 32
End: 2022-09-27
Payer: COMMERCIAL

## 2022-09-27 NOTE — PROGRESS NOTES
Please call patient and let her know she tested positive for BV. I sent antibiotics to her pharmacy. Thank you.

## 2022-09-29 ENCOUNTER — TELEPHONE (OUTPATIENT)
Dept: OBSTETRICS AND GYNECOLOGY | Facility: CLINIC | Age: 32
End: 2022-09-29
Payer: COMMERCIAL

## 2022-09-29 NOTE — TELEPHONE ENCOUNTER
Hey Yee,  Your swab came back positive for bacterial vaginosis. I sent antibiotics to your pharmacy for treatment. Please reach out with any questions. -Deepika   Written by Deepika Benedict CNM on 9/21/2022 12:35 PM CDT      Called patient and after verifying with 2 identifiers the above results discussed.  Patient verbalized understanding.

## 2022-09-30 ENCOUNTER — TELEPHONE (OUTPATIENT)
Dept: OBSTETRICS AND GYNECOLOGY | Facility: CLINIC | Age: 32
End: 2022-09-30
Payer: COMMERCIAL

## 2022-09-30 NOTE — TELEPHONE ENCOUNTER
[address] [place] [address]  Phone: [phone]           I have received a copy of my After Visit Summary and discharge instructions from [facility].    INSTRUCTIONS RECEIVED AND UNDERSTOOD BY:                     Patient/Patient Representative: ________________________________________________________________     Date/Time: ________________________________________________________________                     Instructions Given By: ________________________________________________________________     Date/Time: ________________________________________________________________            Attempted to contact patient to inform of results. Patient did not answer. LVM to rtc to clinic.     After 3 unsuccessful attempts a letter has been mailed to address provided in chart.

## 2022-09-30 NOTE — PROGRESS NOTES
Attempted to contact patient to inform of results. Patient did not answer. LVM to rtc to clinic.     After 3 unsuccessful attempts a letter has been mailed to address provided in chart.

## 2022-10-03 ENCOUNTER — TELEPHONE (OUTPATIENT)
Dept: OBSTETRICS AND GYNECOLOGY | Facility: CLINIC | Age: 32
End: 2022-10-03
Payer: COMMERCIAL

## 2022-10-04 ENCOUNTER — PROCEDURE VISIT (OUTPATIENT)
Dept: OBSTETRICS AND GYNECOLOGY | Facility: CLINIC | Age: 32
End: 2022-10-04
Payer: COMMERCIAL

## 2022-10-04 ENCOUNTER — ROUTINE PRENATAL (OUTPATIENT)
Dept: OBSTETRICS AND GYNECOLOGY | Facility: CLINIC | Age: 32
End: 2022-10-04
Payer: COMMERCIAL

## 2022-10-04 VITALS — DIASTOLIC BLOOD PRESSURE: 64 MMHG | SYSTOLIC BLOOD PRESSURE: 120 MMHG | WEIGHT: 195.31 LBS | BODY MASS INDEX: 29.7 KG/M2

## 2022-10-04 DIAGNOSIS — Z34.80 SUPERVISION OF OTHER NORMAL PREGNANCY: ICD-10-CM

## 2022-10-04 DIAGNOSIS — Z34.92 NORMAL PREGNANCY IN SECOND TRIMESTER: Primary | ICD-10-CM

## 2022-10-04 DIAGNOSIS — Z98.891 HISTORY OF VBAC: ICD-10-CM

## 2022-10-04 DIAGNOSIS — Z36.2 ENCOUNTER FOR FOLLOW-UP ULTRASOUND OF FETAL ANATOMY: ICD-10-CM

## 2022-10-04 PROCEDURE — 76805 OB US >/= 14 WKS SNGL FETUS: CPT | Mod: S$GLB,,, | Performed by: OBSTETRICS & GYNECOLOGY

## 2022-10-04 PROCEDURE — 99999 PR PBB SHADOW E&M-EST. PATIENT-LVL III: ICD-10-PCS | Mod: PBBFAC,,, | Performed by: ADVANCED PRACTICE MIDWIFE

## 2022-10-04 PROCEDURE — 76805 US OB/GYN PROCEDURE (VIEWPOINT): ICD-10-PCS | Mod: S$GLB,,, | Performed by: OBSTETRICS & GYNECOLOGY

## 2022-10-04 PROCEDURE — 0502F PR SUBSEQUENT PRENATAL CARE: ICD-10-PCS | Mod: CPTII,S$GLB,, | Performed by: ADVANCED PRACTICE MIDWIFE

## 2022-10-04 PROCEDURE — 0502F SUBSEQUENT PRENATAL CARE: CPT | Mod: CPTII,S$GLB,, | Performed by: ADVANCED PRACTICE MIDWIFE

## 2022-10-04 PROCEDURE — 99999 PR PBB SHADOW E&M-EST. PATIENT-LVL III: CPT | Mod: PBBFAC,,, | Performed by: ADVANCED PRACTICE MIDWIFE

## 2022-10-04 NOTE — PROGRESS NOTES
10/04/2022  32 y.o. female  at 20w2d   Is feeling flutters/FM, denies VB, LOF or cramping  Doing well without concerns   TWG: 10 lbs   Reviewed anatomy US-breech, anterior placenta, three-vessel cord, normal fluid, EFW 13 oz normal female anatomy with suboptimal heart and thorax.  No abnormal monitor is noted reassured and will follow-up ultrasound next visit  Genetic testing declined    Reviewed warning signs, normal FM,  labor precautions and how/when to call.  RTC x 4 wks, call or present sooner prn.     I spent a total of 20 minutes on the day of the visit.This includes face to face time and non-face to face time preparing to see the patient (eg, review of tests), Obtaining and/or reviewing separately obtained history, Documenting clinical information in the electronic or other health record, Independently interpreting resultsand communicating results to the patient/family/caregiver, or Care coordination.

## 2022-10-04 NOTE — LETTER
October 4, 2022      [facility]  [address]  [place] [address]  Phone: [phone]       Patient: Yee Adler   YOB: 1990  Date of Visit: 10/04/2022    To Whom It May Concern:    Santiago Adler  was at [facility] on 10/04/2022. The patient may return to work/school on 10/5/2022 with no restrictions. If you have any questions or concerns, or if I can be of further assistance, please do not hesitate to contact me.    Sincerely,    Mounika Sofia CNM

## 2022-10-05 NOTE — PATIENT INSTRUCTIONS
Patient Education       Pregnancy - The Fourth Month   About this topic   It is important for you to learn how to take care of yourself to help you have a healthy baby and safe delivery. It is good to have health care throughout your pregnancy.  The fourth month of your pregnancy starts around week 14 and lasts through week 18. By knowing how far along you are, you can learn what is normal for this stage of your pregnancy and plan for what is next.  General   Growth and Development   During the fourth month of your pregnancy, here are some things you can expect.  You may:  Start to show that you are pregnant. It is normal to gain about 5 to 10 pounds (2.3 to 4.5 kg) total in your first 4 months.  Have heartburn  Feel like you have trouble paying attention to things  Have less nausea  Notice your breasts are growing and the veins are easier to see on them  Have swollen veins in your legs and feet, more nosebleeds, or bleeding when you brush your teeth. These are all because of the extra blood your body has while you are pregnant.  Notice more swelling in your hands and feet  Start to feel fluttering when you are lying or sitting quietly. This is your baby kicking.  Have pain in your sides with sudden movement. This is normal and happens because the ligaments in your belly are stretching.  Have a little more energy. Exercise is good for you, but check with your doctor before starting new exercises.  Most of the time it is safe for you to have sex while you are pregnant. It wont hurt the baby.  Your babys:  Skin is very thin and you can easily see blood vessels through it. Your baby is covered with lots of fine hair to protect their skin.  Bones are starting to harden. Your baby is able to frown, smile, stretch, and move.  Practicing breathing movements while inside of your womb  About 6 inches (16 cm) long and weighs about 7 ounces (200 gm). Your baby is about the size of an orange.  Things to Think About   Avoid  alcohol, drugs, tobacco products, and second hand smoke  Check with your doctor before taking any kind of drugs. Continue to take your vitamin with folic acid.  Avoid cleaning cat litter boxes. This can cause a disease that causes birth defects in your baby.  Amniocentesis and other prenatal screening tests may be done this month.  Try sleeping on your side. Use a pillow between your legs. Avoid sleeping on your back. This will help with the blood flow to your baby.  Change positions and get up slowly. Your heart has to work hard to cope with all of the extra blood volume.  Where will you take your baby for care after they are born? This is a good time to find a doctor for your baby.  Eat fresh fruits and foods with a lot of fiber to help with hard stools.  Drink at least 6 to 8 glasses of water each day.  When do I need to call the doctor?   Vaginal bleeding  Leaking of fluid from your vagina  Problems with constipation  Belly pain  Any illness or infection  Severe headaches or headaches that wont go away  Where can I learn more?   Better Health  https://www.betterhealth.[place].gov.au/health/HealthyLiving/pregnancy-stages-and-changes   Family Doctor  https://familydoctor.org/changes-in-your-body-during-pregnancy-first-trimester/   Last Reviewed Date   2020-04-20  Consumer Information Use and Disclaimer   This information is not specific medical advice and does not replace information you receive from your health care provider. This is only a brief summary of general information. It does NOT include all information about conditions, illnesses, injuries, tests, procedures, treatments, therapies, discharge instructions or life-style choices that may apply to you. You must talk with your health care provider for complete information about your health and treatment options. This information should not be used to decide whether or not to accept your health care providers advice, instructions or recommendations. Only your  health care provider has the knowledge and training to provide advice that is right for you.  Copyright   Copyright © 2021 [employer] and its affiliates and/or licensors. All rights reserved.

## 2022-11-02 ENCOUNTER — PROCEDURE VISIT (OUTPATIENT)
Dept: OBSTETRICS AND GYNECOLOGY | Facility: CLINIC | Age: 32
End: 2022-11-02
Payer: COMMERCIAL

## 2022-11-02 ENCOUNTER — ROUTINE PRENATAL (OUTPATIENT)
Dept: OBSTETRICS AND GYNECOLOGY | Facility: CLINIC | Age: 32
End: 2022-11-02
Payer: COMMERCIAL

## 2022-11-02 VITALS — BODY MASS INDEX: 30.3 KG/M2 | SYSTOLIC BLOOD PRESSURE: 106 MMHG | WEIGHT: 199.31 LBS | DIASTOLIC BLOOD PRESSURE: 68 MMHG

## 2022-11-02 DIAGNOSIS — Z34.92 NORMAL PREGNANCY IN SECOND TRIMESTER: Primary | ICD-10-CM

## 2022-11-02 DIAGNOSIS — Z36.2 ENCOUNTER FOR FOLLOW-UP ULTRASOUND OF FETAL ANATOMY: ICD-10-CM

## 2022-11-02 DIAGNOSIS — Z98.891 HISTORY OF VBAC: ICD-10-CM

## 2022-11-02 PROCEDURE — 76816 US OB/GYN PROCEDURE (VIEWPOINT): ICD-10-PCS | Mod: S$GLB,,, | Performed by: OBSTETRICS & GYNECOLOGY

## 2022-11-02 PROCEDURE — 76816 OB US FOLLOW-UP PER FETUS: CPT | Mod: S$GLB,,, | Performed by: OBSTETRICS & GYNECOLOGY

## 2022-11-02 PROCEDURE — 99999 PR PBB SHADOW E&M-EST. PATIENT-LVL III: CPT | Mod: PBBFAC,,, | Performed by: ADVANCED PRACTICE MIDWIFE

## 2022-11-02 PROCEDURE — 99999 PR PBB SHADOW E&M-EST. PATIENT-LVL III: ICD-10-PCS | Mod: PBBFAC,,, | Performed by: ADVANCED PRACTICE MIDWIFE

## 2022-11-02 PROCEDURE — 0502F SUBSEQUENT PRENATAL CARE: CPT | Mod: CPTII,S$GLB,, | Performed by: ADVANCED PRACTICE MIDWIFE

## 2022-11-02 PROCEDURE — 0502F PR SUBSEQUENT PRENATAL CARE: ICD-10-PCS | Mod: CPTII,S$GLB,, | Performed by: ADVANCED PRACTICE MIDWIFE

## 2022-11-02 NOTE — PATIENT INSTRUCTIONS
Patient Education       Pregnancy - The Fifth Month   About this topic   It is important for you to learn how to take care of yourself to help you have a healthy baby and safe delivery. It is good to have health care throughout your pregnancy.  The fifth month of your pregnancy starts around week 19 and lasts through week 23. By knowing how far along you are, you can learn what is normal for this stage of your pregnancy and plan for what is next.  General   Growth and Development   During the fifth month of your pregnancy, here are some things you can expect.  You may:  Start to gain a little more weight. It is normal to gain about 10 to 15 pounds (4.5 to 7 kg) total in your first 5 months.  Have leg cramps. Be sure to drink plenty of water.  Have loose teeth.  Have more trouble breathing or with heartburn as your baby gets bigger  Start having Victoria Casas contractions. You may feel your belly squeezing or getting tight. Be sure to drink 6 to 8 glasses of water each day.  Notice you are able to express milk from your breasts  See stretch marks on your belly, breasts, or legs. Stay active to try and keep good muscle tone.  Be checked for gestational diabetes  Your baby's growth and development:  Your baby is covered with a thick whitish substance called vernix. This helps protect your babys skin.  Their genitals are able to be seen on an ultrasound. Your doctor will check your babys size, how much fluid there is around your baby, and all of your babys organs with the ultrasound.  Your baby is starting to be able to see, hear, taste, and feel touch.  The bones are starting to make blood cells.  Your baby is about 11 inches (28 cm) long and weighs about 1 pound (450 gm). Your baby is about the size of a grapefruit.  Things to Think About   Avoid alcohol, drugs, tobacco products, and second hand smoke  Do not clean your cat litter box. You could get a disease that causes birth defects to your baby.  Check with your  doctor before taking any kind of drugs. Continue to take your vitamin with folic acid.  Do you plan to breastfeed your baby?  Where will you deliver? Do you plan to take prenatal classes? If so, try to have them completed by your 8th month.  Start to think about your plans for pain control during labor.  You may want to learn about cord blood banking.  Rest and take breaks each day.  When do I need to call the doctor?   Contractions every 10 minutes or more often that do not go away with drinking water or position changes  Low, dull back pain that does not go away  Pressure in your pelvis that feels like your baby is pushing down  A gush or constant trickle of watery or bloody fluid leaking from your vagina  Cramps in your lower belly that come and go or are constant  Little to no movement felt by baby in 2 hours. Your baby should move at least 10 times every 2 hours.  Headache that does not go away, blurry vision, seeing spots or halos, increase in swelling in your hands, feet, or face, and pain under your ribs on the right side  Fever of 100.4°F (38°C) or higher  Bleeding or swelling of your gums  Urinating less, or having pain when you urinate  Vaginal bleeding with or without pain  After a car accident, fall, or any trauma to your belly  Having thoughts of harming yourself or others, or do not feel safe at home  Where can I learn more?   American Academy of Family Physicians  https://familydoctor.org/changes-in-your-body-during-pregnancy-second-trimester/   Better Health  https://www.betterhealth.[place].gov.[place]/health/HealthyLiving/pregnancy-stages-and-changes   Last Reviewed Date   2020-04-20  Consumer Information Use and Disclaimer   This information is not specific medical advice and does not replace information you receive from your health care provider. This is only a brief summary of general information. It does NOT include all information about conditions, illnesses, injuries, tests, procedures, treatments,  therapies, discharge instructions or life-style choices that may apply to you. You must talk with your health care provider for complete information about your health and treatment options. This information should not be used to decide whether or not to accept your health care providers advice, instructions or recommendations. Only your health care provider has the knowledge and training to provide advice that is right for you.  Copyright   Copyright © 2021 [employer] and its affiliates and/or licensors. All rights reserved.

## 2022-11-02 NOTE — PROGRESS NOTES
32 y.o. female  at 24w2d   Reports + FM, denies VB, LOF, or cramping  Doing well without concerns     Ultrasound follow-up views-vertex, MVP 6.3 cm, EFW 1 lb 7 oz at 37 percentile, face, heart, abdomen and extremities visualized-appear normal.  Fetal anatomy is complete and reassuring    History of previous  section with successful .  Would like to be back again will send message for OBGYN approval    TW lbs   Breast pump Rx:   Reviewed upcoming 28wk labs, (A POS) and orders placed, tdap handout provided and explained  Reviewed warning signs, normal FM,  labor precautions and how/when to call.  RTC x 2-3 wks, with 28 week labs call or present sooner prn.     I spent a total of 20 minutes on the day of the visit.This includes face to face time and non-face to face time preparing to see the patient (eg, review of tests), Obtaining and/or reviewing separately obtained history, Documenting clinical information in the electronic or other health record, Independently interpreting resultsand communicating results to the patient/family/caregiver, or Care coordination.

## 2022-11-07 ENCOUNTER — PATIENT MESSAGE (OUTPATIENT)
Dept: ADMINISTRATIVE | Facility: OTHER | Age: 32
End: 2022-11-07
Payer: COMMERCIAL

## 2022-11-22 ENCOUNTER — LAB VISIT (OUTPATIENT)
Dept: LAB | Facility: HOSPITAL | Age: 32
End: 2022-11-22
Attending: ADVANCED PRACTICE MIDWIFE
Payer: COMMERCIAL

## 2022-11-22 ENCOUNTER — ROUTINE PRENATAL (OUTPATIENT)
Dept: OBSTETRICS AND GYNECOLOGY | Facility: CLINIC | Age: 32
End: 2022-11-22
Payer: COMMERCIAL

## 2022-11-22 VITALS
WEIGHT: 199.19 LBS | DIASTOLIC BLOOD PRESSURE: 68 MMHG | SYSTOLIC BLOOD PRESSURE: 116 MMHG | BODY MASS INDEX: 30.29 KG/M2

## 2022-11-22 DIAGNOSIS — Z34.92 NORMAL PREGNANCY IN SECOND TRIMESTER: ICD-10-CM

## 2022-11-22 DIAGNOSIS — Z34.92 NORMAL PREGNANCY IN SECOND TRIMESTER: Primary | ICD-10-CM

## 2022-11-22 DIAGNOSIS — Z98.891 HISTORY OF VBAC: ICD-10-CM

## 2022-11-22 LAB
BASOPHILS # BLD AUTO: 0.04 K/UL (ref 0–0.2)
BASOPHILS NFR BLD: 0.5 % (ref 0–1.9)
DIFFERENTIAL METHOD: ABNORMAL
EOSINOPHIL # BLD AUTO: 0.1 K/UL (ref 0–0.5)
EOSINOPHIL NFR BLD: 0.7 % (ref 0–8)
ERYTHROCYTE [DISTWIDTH] IN BLOOD BY AUTOMATED COUNT: 14.7 % (ref 11.5–14.5)
HCT VFR BLD AUTO: 31.1 % (ref 37–48.5)
HGB BLD-MCNC: 9.5 G/DL (ref 12–16)
IMM GRANULOCYTES # BLD AUTO: 0.08 K/UL (ref 0–0.04)
IMM GRANULOCYTES NFR BLD AUTO: 1.1 % (ref 0–0.5)
LYMPHOCYTES # BLD AUTO: 1.5 K/UL (ref 1–4.8)
LYMPHOCYTES NFR BLD: 20.1 % (ref 18–48)
MCH RBC QN AUTO: 23.1 PG (ref 27–31)
MCHC RBC AUTO-ENTMCNC: 30.5 G/DL (ref 32–36)
MCV RBC AUTO: 76 FL (ref 82–98)
MONOCYTES # BLD AUTO: 0.4 K/UL (ref 0.3–1)
MONOCYTES NFR BLD: 5.2 % (ref 4–15)
NEUTROPHILS # BLD AUTO: 5.4 K/UL (ref 1.8–7.7)
NEUTROPHILS NFR BLD: 72.4 % (ref 38–73)
NRBC BLD-RTO: 0 /100 WBC
PLATELET # BLD AUTO: 249 K/UL (ref 150–450)
PMV BLD AUTO: 12.4 FL (ref 9.2–12.9)
RBC # BLD AUTO: 4.12 M/UL (ref 4–5.4)
WBC # BLD AUTO: 7.45 K/UL (ref 3.9–12.7)

## 2022-11-22 PROCEDURE — 99999 PR PBB SHADOW E&M-EST. PATIENT-LVL III: CPT | Mod: PBBFAC,,, | Performed by: ADVANCED PRACTICE MIDWIFE

## 2022-11-22 PROCEDURE — 0502F SUBSEQUENT PRENATAL CARE: CPT | Mod: CPTII,S$GLB,, | Performed by: ADVANCED PRACTICE MIDWIFE

## 2022-11-22 PROCEDURE — 82950 GLUCOSE TEST: CPT | Performed by: ADVANCED PRACTICE MIDWIFE

## 2022-11-22 PROCEDURE — 85025 COMPLETE CBC W/AUTO DIFF WBC: CPT | Performed by: ADVANCED PRACTICE MIDWIFE

## 2022-11-22 PROCEDURE — 99999 PR PBB SHADOW E&M-EST. PATIENT-LVL III: ICD-10-PCS | Mod: PBBFAC,,, | Performed by: ADVANCED PRACTICE MIDWIFE

## 2022-11-22 PROCEDURE — 36415 COLL VENOUS BLD VENIPUNCTURE: CPT | Mod: PN | Performed by: ADVANCED PRACTICE MIDWIFE

## 2022-11-22 PROCEDURE — 0502F PR SUBSEQUENT PRENATAL CARE: ICD-10-PCS | Mod: CPTII,S$GLB,, | Performed by: ADVANCED PRACTICE MIDWIFE

## 2022-11-22 PROCEDURE — 86592 SYPHILIS TEST NON-TREP QUAL: CPT | Performed by: ADVANCED PRACTICE MIDWIFE

## 2022-11-22 PROCEDURE — 87389 HIV-1 AG W/HIV-1&-2 AB AG IA: CPT | Performed by: ADVANCED PRACTICE MIDWIFE

## 2022-11-22 NOTE — PROGRESS NOTES
32 y.o. female  at 27w1d   Reports + FM, denies VB, LOF or CTX  Doing well without concerns     History of  with  successful  desiring  again- approved-Dr. Verito DEJESUS: 14 lbs    28wk labs today (A POS)    Consider Tdap next visit  Declines flu shot    Reviewed warning signs, normal FKCs,  labor precautions and how/when to call.  RTC x 2 wks, call or present sooner prn.     I spent a total of 20 minutes on the day of the visit.This includes face to face time and non-face to face time preparing to see the patient (eg, review of tests), Obtaining and/or reviewing separately obtained history, Documenting clinical information in the electronic or other health record, Independently interpreting resultsand communicating results to the patient/family/caregiver, or Care coordination.

## 2022-11-22 NOTE — PATIENT INSTRUCTIONS
Patient Education       Pregnancy - The Sixth Month   About this topic   It is important for you to learn how to take care of yourself to help you have a healthy baby and safe delivery. It is good to have health care throughout your pregnancy.  The sixth month of your pregnancy starts around week 24 and lasts through week 28. By knowing how far along you are, you can learn what is normal for this stage of your pregnancy and plan for what is next.  General   Your body   During the sixth month of your pregnancy, here are some things you can expect.  You may:  Start to gain a little more weight. It is normal to gain about 10 to 15 pounds (4.5 to 7 kg) total in your first 6 months.  Have problems like back pain, dry eyes, or aching hands and wrists  Itching of palms, abdomen, or soles of your feet  Swelling of ankles, fingers, or face.  Need to urinate more frequently.  Have problems with hemorrhoids. Be sure to eat plenty of fresh fruits and vegetables to increase the fiber in your diet. Drink plenty of water to help keep your stools soft.  Continue having Bullock Casas contractions. You may feel your belly squeezing or getting tight.  Have a glucose test to test for gestational diabetes.  Have more headaches. Talk to your doctor about how to help with them.  See stretch marks on your belly, breasts, or legs. Stay active to try and keep good muscle tone.  See an increase in vaginal discharge. Talk to your doctor about what to expect.  Your baby's growth and development:  Your baby looks like a very small  baby.  They are able to live outside of your womb but would need a lot of help breathing, eating, and staying warm in an intensive care unit.  Your baby has times of being awake and times of being asleep. The babys eyelids are able to open and close.  They move more and have hiccups.  Your baby is about 14 inches (36 cm) long and weighs about 2 pounds (900 gm). Your baby is about the size of an eggplant.  Baby  may be able to hear voices.  Things to Think About   Avoid alcohol, drugs, tobacco products, and second hand smoke  Eat small meals throughout the day instead of larger meals.  Avoid spicy, greasy, or fatty foods.  Avoid lying down or bending over for around 3 hours after eating  Warm baths are fine to help you relax. Avoid hot tubs while you are pregnant.  Avoid straining when having bowel movements.  Learning how to care for your baby. You may want to sign up for classes on how to take care of a .  Are you planning on going back to work after having your baby? Its not too early to think about .  Consider starting your birth plan if you have specific needs or wishes for delivery.  When do I need to call the doctor?   Contractions every 10 minutes or more often that do not go away with drinking water or position changes  Low, dull back pain that does not go away  Pressure in your pelvis that feels like your baby is pushing down  A gush or constant trickle of watery or bloody fluid leaking from your vagina  Cramps in your lower belly that come and go or are constant  Change in your baby's movement  Fever of 100.4°F (38°C) or higher  Little to no movement felt by baby in 2 hours. Your baby should be moving at least 10 times every 2 hours.  Headache that does not go away; blurry vision; seeing spots or halos; increase in swelling in your hands, feet, or face; and pain under your ribs on the right side  Vaginal bleeding with or without pain  After a car accident, fall, or any trauma to your belly  Having thoughts of harming yourself or others, or do not feel safe at home  Where can I learn more?   American Academy of Family Physicians  https://familydoctor.org/changes-in-your-body-during-pregnancy-second-trimester/   KidsHealth  http://kidshealth.org/en/parents/pregnancy-calendar-intro.html?WT.ac=p-arsh   Office of Womens  Health  https://www.womenshealth.gov/pregnancy/youre-pregnant-now-what/stages-pregnancy   Last Reviewed Date   2020-05-06  Consumer Information Use and Disclaimer   This information is not specific medical advice and does not replace information you receive from your health care provider. This is only a brief summary of general information. It does NOT include all information about conditions, illnesses, injuries, tests, procedures, treatments, therapies, discharge instructions or life-style choices that may apply to you. You must talk with your health care provider for complete information about your health and treatment options. This information should not be used to decide whether or not to accept your health care providers advice, instructions or recommendations. Only your health care provider has the knowledge and training to provide advice that is right for you.  Copyright   Copyright © 2021 [employer] and its affiliates and/or licensors. All rights reserved.

## 2022-11-23 LAB
GLUCOSE SERPL-MCNC: 98 MG/DL (ref 70–140)
HIV 1+2 AB+HIV1 P24 AG SERPL QL IA: NORMAL
RPR SER QL: NORMAL

## 2022-12-09 ENCOUNTER — ROUTINE PRENATAL (OUTPATIENT)
Dept: OBSTETRICS AND GYNECOLOGY | Facility: CLINIC | Age: 32
End: 2022-12-09
Payer: COMMERCIAL

## 2022-12-09 VITALS
SYSTOLIC BLOOD PRESSURE: 116 MMHG | WEIGHT: 207.31 LBS | DIASTOLIC BLOOD PRESSURE: 64 MMHG | BODY MASS INDEX: 31.53 KG/M2

## 2022-12-09 DIAGNOSIS — Z98.891 HISTORY OF VBAC: ICD-10-CM

## 2022-12-09 DIAGNOSIS — Z34.92 NORMAL PREGNANCY IN SECOND TRIMESTER: Primary | ICD-10-CM

## 2022-12-09 DIAGNOSIS — O99.012 ANEMIA AFFECTING PREGNANCY IN SECOND TRIMESTER: ICD-10-CM

## 2022-12-09 PROCEDURE — 0502F PR SUBSEQUENT PRENATAL CARE: ICD-10-PCS | Mod: CPTII,S$GLB,, | Performed by: ADVANCED PRACTICE MIDWIFE

## 2022-12-09 PROCEDURE — 99999 PR PBB SHADOW E&M-EST. PATIENT-LVL III: CPT | Mod: PBBFAC,,, | Performed by: ADVANCED PRACTICE MIDWIFE

## 2022-12-09 PROCEDURE — 0502F SUBSEQUENT PRENATAL CARE: CPT | Mod: CPTII,S$GLB,, | Performed by: ADVANCED PRACTICE MIDWIFE

## 2022-12-09 PROCEDURE — 99999 PR PBB SHADOW E&M-EST. PATIENT-LVL III: ICD-10-PCS | Mod: PBBFAC,,, | Performed by: ADVANCED PRACTICE MIDWIFE

## 2022-12-09 NOTE — PROGRESS NOTES
32 y.o. female  at 29w4d   Reports + FM, denies VB, LOF or CTX  Doing well without concerns     History of  with successful  desiring  again which has been approved    Anemia-taking iron but is having some side effects recently.  Plans to change brand    TW lbs   Reviewed 28wk lab results (A POS) 1 hour glucose is 98  Declined Tdap   Anemia-takes iron supplements  Reviewed warning signs, normal FKCs,  labor precautions and how/when to call.  RTC x 2 wks, call or present sooner prn.     I spent a total of 20 minutes on the day of the visit.This includes face to face time and non-face to face time preparing to see the patient (eg, review of tests), Obtaining and/or reviewing separately obtained history, Documenting clinical information in the electronic or other health record, Independently interpreting resultsand communicating results to the patient/family/caregiver, or Care coordination.

## 2022-12-09 NOTE — PATIENT INSTRUCTIONS
Patient Education       Pregnancy - The Seventh Month   About this topic   It is important for you to learn how to take care of yourself to help you have a healthy baby and safe delivery. It is good to have health care throughout your pregnancy.  The seventh month of your pregnancy starts around week 29 and lasts through week 32. By knowing how far along you are, you can learn what is normal for this stage of your pregnancy and plan for what is next.  General   Your body   During the seventh month of your pregnancy, here are some things you can expect.  You may:  Have weight gain of about 15 to 20 pounds (6.8 to 9 kg) total in your first 7 months.  Have more trouble moving about and sleeping as the baby gets bigger  Have very vivid dreams  Notice more vaginal discharge  Notice a creamy, watery substance leaking from your nipples  Need a shot called Rh immune globulin if your blood type may be different from your baby's  Your baby's growth and development:  Your baby is gaining weight and adding layers of fat.  Your baby turns to be head down, into the position for delivery.  They are able to respond to loud noises and to dream.  Your baby moves at least 10 times in 2 hours. If your baby isn't moving this much, talk to your doctor right away.  Your baby is about 16 inches (42 cm) long and weighs about 4 pounds (1,800 gm). Your baby is about the size of squash.  Things to Think About   Avoid alcohol, drugs, tobacco products, and second hand smoke  Think about what you want your baby's birth to be like. Who do you want with you?  Find out about what lactation services are covered by your insurance.  Look into lactation consultants and breastfeeding classes.  Where do you want to deliver? Its time to make a plan for labor and delivery.  Plan on getting a car seat and other things for your baby.  Talk to your doctor if you plan to travel or get on a plane.  When do I need to call the doctor?   Contractions every 10  minutes or more often that do not go away with drinking water or position changes.  Low, dull back pain that does not go away  Feeling unusually dizzy or tired  Pressure in your pelvis that feels like your baby is pushing down  A gush or constant trickle of watery or bloody fluid leaking from your vagina  Cramps in your lower belly that come and go or are constant  Little to no movement felt by baby in 2 hours. Your baby should be moving at least 10 times every 2 hours.  Headache that does not go away; blurry vision; seeing spots or halos; increase in swelling in your hands, feet, or face; and pain under your ribs on the right side  Vaginal bleeding with or without pain  Fever of 100.4°F (38°C) or higher  After a car accident, fall, or any trauma to your belly  Having thoughts of harming yourself or others, or do not feel safe at home  Where can I learn more?   American Academy of Family Physicians  https://familydoctor.org/changes-in-your-body-during-pregnancy-second-trimester/   American Academy of Family Physicians  https://familydoctor.org/changes-in-your-body-during-pregnancy-third-trimester/   [employer]  http://kidshealth.org/en/parents/pregnancy-calendar-intro.html?WT.ac=p-arsh   Office on Womens Health  https://www.womenshealth.gov/pregnancy/youre-pregnant-now-what/stages-pregnancy   Last Reviewed Date   2020-05-06  Consumer Information Use and Disclaimer   This information is not specific medical advice and does not replace information you receive from your health care provider. This is only a brief summary of general information. It does NOT include all information about conditions, illnesses, injuries, tests, procedures, treatments, therapies, discharge instructions or life-style choices that may apply to you. You must talk with your health care provider for complete information about your health and treatment options. This information should not be used to decide whether or not to accept your health  care providers advice, instructions or recommendations. Only your health care provider has the knowledge and training to provide advice that is right for you.  Copyright   Copyright © 2021 [employer] and its affiliates and/or licensors. All rights reserved.

## 2022-12-20 ENCOUNTER — ROUTINE PRENATAL (OUTPATIENT)
Dept: OBSTETRICS AND GYNECOLOGY | Facility: CLINIC | Age: 32
End: 2022-12-20
Payer: COMMERCIAL

## 2022-12-20 VITALS
WEIGHT: 211.88 LBS | SYSTOLIC BLOOD PRESSURE: 118 MMHG | DIASTOLIC BLOOD PRESSURE: 70 MMHG | BODY MASS INDEX: 32.21 KG/M2

## 2022-12-20 DIAGNOSIS — O99.012 ANEMIA AFFECTING PREGNANCY IN SECOND TRIMESTER: ICD-10-CM

## 2022-12-20 DIAGNOSIS — Z98.891 HISTORY OF VBAC: Primary | ICD-10-CM

## 2022-12-20 DIAGNOSIS — Z34.92 NORMAL PREGNANCY IN SECOND TRIMESTER: ICD-10-CM

## 2022-12-20 PROCEDURE — 0502F PR SUBSEQUENT PRENATAL CARE: ICD-10-PCS | Mod: CPTII,S$GLB,, | Performed by: ADVANCED PRACTICE MIDWIFE

## 2022-12-20 PROCEDURE — 0502F SUBSEQUENT PRENATAL CARE: CPT | Mod: CPTII,S$GLB,, | Performed by: ADVANCED PRACTICE MIDWIFE

## 2022-12-20 PROCEDURE — 99999 PR PBB SHADOW E&M-EST. PATIENT-LVL III: ICD-10-PCS | Mod: PBBFAC,,, | Performed by: ADVANCED PRACTICE MIDWIFE

## 2022-12-20 PROCEDURE — 99999 PR PBB SHADOW E&M-EST. PATIENT-LVL III: CPT | Mod: PBBFAC,,, | Performed by: ADVANCED PRACTICE MIDWIFE

## 2022-12-20 NOTE — PROGRESS NOTES
32 y.o. female  at 31w1d   Reports + FM, denies VB, LOF or CTX  Doing well without concerns   TW lbs   Reviewed 28wk lab results (A POS)  Declines Tdap  Mild Anemia Fe supplement  Reviewed warning signs, normal FKCs,  labor precautions and how/when to call.  RTC x 2 wks, call or present sooner prn.     I spent a total of 20 minutes on the day of the visit.This includes face to face time and non-face to face time preparing to see the patient (eg, review of tests), Obtaining and/or reviewing separately obtained history, Documenting clinical information in the electronic or other health record, Independently interpreting resultsand communicating results to the patient/family/caregiver, or Care coordination.

## 2022-12-20 NOTE — PATIENT INSTRUCTIONS
Patient Education       Pregnancy - The Seventh Month   About this topic   It is important for you to learn how to take care of yourself to help you have a healthy baby and safe delivery. It is good to have health care throughout your pregnancy.  The seventh month of your pregnancy starts around week 29 and lasts through week 32. By knowing how far along you are, you can learn what is normal for this stage of your pregnancy and plan for what is next.  General   Your body   During the seventh month of your pregnancy, here are some things you can expect.  You may:  Have weight gain of about 15 to 20 pounds (6.8 to 9 kg) total in your first 7 months.  Have more trouble moving about and sleeping as the baby gets bigger  Have very vivid dreams  Notice more vaginal discharge  Notice a creamy, watery substance leaking from your nipples  Need a shot called Rh immune globulin if your blood type may be different from your baby's  Your baby's growth and development:  Your baby is gaining weight and adding layers of fat.  Your baby turns to be head down, into the position for delivery.  They are able to respond to loud noises and to dream.  Your baby moves at least 10 times in 2 hours. If your baby isn't moving this much, talk to your doctor right away.  Your baby is about 16 inches (42 cm) long and weighs about 4 pounds (1,800 gm). Your baby is about the size of squash.  Things to Think About   Avoid alcohol, drugs, tobacco products, and second hand smoke  Think about what you want your baby's birth to be like. Who do you want with you?  Find out about what lactation services are covered by your insurance.  Look into lactation consultants and breastfeeding classes.  Where do you want to deliver? Its time to make a plan for labor and delivery.  Plan on getting a car seat and other things for your baby.  Talk to your doctor if you plan to travel or get on a plane.  When do I need to call the doctor?   Contractions every 10  minutes or more often that do not go away with drinking water or position changes.  Low, dull back pain that does not go away  Feeling unusually dizzy or tired  Pressure in your pelvis that feels like your baby is pushing down  A gush or constant trickle of watery or bloody fluid leaking from your vagina  Cramps in your lower belly that come and go or are constant  Little to no movement felt by baby in 2 hours. Your baby should be moving at least 10 times every 2 hours.  Headache that does not go away; blurry vision; seeing spots or halos; increase in swelling in your hands, feet, or face; and pain under your ribs on the right side  Vaginal bleeding with or without pain  Fever of 100.4°F (38°C) or higher  After a car accident, fall, or any trauma to your belly  Having thoughts of harming yourself or others, or do not feel safe at home  Where can I learn more?   American Academy of Family Physicians  https://familydoctor.org/changes-in-your-body-during-pregnancy-second-trimester/   American Academy of Family Physicians  https://familydoctor.org/changes-in-your-body-during-pregnancy-third-trimester/   [employer]  http://kidshealth.org/en/parents/pregnancy-calendar-intro.html?WT.ac=p-arsh   Office on Womens Health  https://www.womenshealth.gov/pregnancy/youre-pregnant-now-what/stages-pregnancy   Last Reviewed Date   2020-05-06  Consumer Information Use and Disclaimer   This information is not specific medical advice and does not replace information you receive from your health care provider. This is only a brief summary of general information. It does NOT include all information about conditions, illnesses, injuries, tests, procedures, treatments, therapies, discharge instructions or life-style choices that may apply to you. You must talk with your health care provider for complete information about your health and treatment options. This information should not be used to decide whether or not to accept your health  care providers advice, instructions or recommendations. Only your health care provider has the knowledge and training to provide advice that is right for you.  Copyright   Copyright © 2021 [employer]. and its affiliates and/or licensors. All rights reserved.  Patient Education       Fetal Movement   About this topic   Feeling your baby move for the first time is a good sign that your baby is doing well. You may begin to feel these movements between the 18th and 25th weeks of your pregnancy. If this is your first time being pregnant, it may be closer to 25 weeks. Your baby has been moving around before this, but the kicks have not been strong enough for you to feel. During the first weeks of feeling movement, you may start to see a pattern during the day when your baby is most active. You can track your baby's kicks each day at home. This is also known as kick counting. It is a good way to check on your baby's movements and well being.  Most often, fetal kick counting is used in high-risk pregnancies. It may be useful for all pregnancies. Counting and writing down your baby's kicks, jabs, twists, flutters, rolls, turns, flips, and swishes may help find a problem that needs more evaluation. The American College of Obstetricians and Gynecologists, or ACOG, suggests that you record how much time it takes you to feel 10 of these movements. Ideally, you should be able to feel 10 movements within 2 hours. Many people will track these movements in much less time.  General   How to Track Your Baby's Kick Counts   Most often your doctor will want you to wait until the 28th to 30th weeks of your pregnancy to start kick counting. Here are some tips to help you get started.  Find the time of day when your baby is most active. For some people, this is right after eating. Others find their baby moving a lot after they have been exercising or more active. Some babies are more active in the evenings when your blood sugar starts  "to lower.  Try to count kicks at about the same time each day.  Before you start counting, have something to eat or drink. Also take a short walk or do some light activity.  Choose a quiet place where you can focus on your baby's movements. Also get in a comfortable position. Try and lie on one side or the other. You may need to change positions until you find one that works best for you and your baby.  Keep a notebook to track your baby's kicks. Your doctor may give you a chart to use or you can make your own. Write down the date, time you started counting, and the time of each "kick" during a 2-hour period until you have felt 10 kicks.  Once you have recorded 10 kicks within 2 hours you can stop counting.  If you are not able to record 10 movements over 2 hours you should get up and move around or eat something and try again.  If you are not able to record 10 movements over 2 hours the second time, call your doctor. They may want you to go to the hospital to get your baby checked.  When do I need to call the doctor?   You have felt less than 10 movements over a period of 2 hours.  It takes longer each day to record 10 movements.  There is a big change in the pattern of movements you are writing down.  You feel no movement for 2 hours even after eating a snack, light activity, and position changes.  Teach Back: Helping You Understand   The Teach Back Method helps you understand the information we are giving you. After you talk with the staff, tell them in your own words what you learned. This helps to make sure the staff has described each thing clearly. It also helps to explain things that may have been confusing. Before going home, make sure you can do these:  I can tell you about feeling my baby move.  I can tell you how I will track my babys kicks.  I can tell you what I will do if I feel less than 10 movements in 2 hours, it takes longer to feel my baby move 10 times, or there is a big change in how my baby " is moving.  Last Reviewed Date   2021-10-08  Consumer Information Use and Disclaimer   This information is not specific medical advice and does not replace information you receive from your health care provider. This is only a brief summary of general information. It does NOT include all information about conditions, illnesses, injuries, tests, procedures, treatments, therapies, discharge instructions or life-style choices that may apply to you. You must talk with your health care provider for complete information about your health and treatment options. This information should not be used to decide whether or not to accept your health care providers advice, instructions or recommendations. Only your health care provider has the knowledge and training to provide advice that is right for you.  Copyright   Copyright © 2021 [employer]. and its affiliates and/or licensors. All rights reserved.

## 2022-12-26 ENCOUNTER — PATIENT MESSAGE (OUTPATIENT)
Dept: ADMINISTRATIVE | Facility: OTHER | Age: 32
End: 2022-12-26
Payer: COMMERCIAL

## 2023-01-03 ENCOUNTER — LAB VISIT (OUTPATIENT)
Dept: LAB | Facility: HOSPITAL | Age: 33
End: 2023-01-03
Attending: ADVANCED PRACTICE MIDWIFE
Payer: COMMERCIAL

## 2023-01-03 ENCOUNTER — ROUTINE PRENATAL (OUTPATIENT)
Dept: OBSTETRICS AND GYNECOLOGY | Facility: CLINIC | Age: 33
End: 2023-01-03
Payer: COMMERCIAL

## 2023-01-03 VITALS
SYSTOLIC BLOOD PRESSURE: 130 MMHG | BODY MASS INDEX: 33.15 KG/M2 | WEIGHT: 218.06 LBS | DIASTOLIC BLOOD PRESSURE: 78 MMHG

## 2023-01-03 DIAGNOSIS — Z34.93 NORMAL PREGNANCY IN THIRD TRIMESTER: Primary | ICD-10-CM

## 2023-01-03 DIAGNOSIS — Z98.891 HISTORY OF VBAC: ICD-10-CM

## 2023-01-03 DIAGNOSIS — Z34.92 NORMAL PREGNANCY IN SECOND TRIMESTER: ICD-10-CM

## 2023-01-03 LAB
BASOPHILS # BLD AUTO: 0.03 K/UL (ref 0–0.2)
BASOPHILS NFR BLD: 0.4 % (ref 0–1.9)
DIFFERENTIAL METHOD: ABNORMAL
EOSINOPHIL # BLD AUTO: 0.1 K/UL (ref 0–0.5)
EOSINOPHIL NFR BLD: 0.6 % (ref 0–8)
ERYTHROCYTE [DISTWIDTH] IN BLOOD BY AUTOMATED COUNT: 15.1 % (ref 11.5–14.5)
HCT VFR BLD AUTO: 29.7 % (ref 37–48.5)
HGB BLD-MCNC: 9.3 G/DL (ref 12–16)
IMM GRANULOCYTES # BLD AUTO: 0.05 K/UL (ref 0–0.04)
IMM GRANULOCYTES NFR BLD AUTO: 0.6 % (ref 0–0.5)
LYMPHOCYTES # BLD AUTO: 1.4 K/UL (ref 1–4.8)
LYMPHOCYTES NFR BLD: 18.4 % (ref 18–48)
MCH RBC QN AUTO: 23.3 PG (ref 27–31)
MCHC RBC AUTO-ENTMCNC: 31.3 G/DL (ref 32–36)
MCV RBC AUTO: 74 FL (ref 82–98)
MONOCYTES # BLD AUTO: 0.5 K/UL (ref 0.3–1)
MONOCYTES NFR BLD: 6.5 % (ref 4–15)
NEUTROPHILS # BLD AUTO: 5.7 K/UL (ref 1.8–7.7)
NEUTROPHILS NFR BLD: 73.5 % (ref 38–73)
NRBC BLD-RTO: 0 /100 WBC
PLATELET # BLD AUTO: 207 K/UL (ref 150–450)
PMV BLD AUTO: 12.3 FL (ref 9.2–12.9)
RBC # BLD AUTO: 4 M/UL (ref 4–5.4)
WBC # BLD AUTO: 7.82 K/UL (ref 3.9–12.7)

## 2023-01-03 PROCEDURE — 86592 SYPHILIS TEST NON-TREP QUAL: CPT | Performed by: ADVANCED PRACTICE MIDWIFE

## 2023-01-03 PROCEDURE — 85025 COMPLETE CBC W/AUTO DIFF WBC: CPT | Performed by: ADVANCED PRACTICE MIDWIFE

## 2023-01-03 PROCEDURE — 82950 GLUCOSE TEST: CPT | Performed by: ADVANCED PRACTICE MIDWIFE

## 2023-01-03 PROCEDURE — 87389 HIV-1 AG W/HIV-1&-2 AB AG IA: CPT | Performed by: ADVANCED PRACTICE MIDWIFE

## 2023-01-03 PROCEDURE — 99999 PR PBB SHADOW E&M-EST. PATIENT-LVL III: ICD-10-PCS | Mod: PBBFAC,,, | Performed by: ADVANCED PRACTICE MIDWIFE

## 2023-01-03 PROCEDURE — 0502F SUBSEQUENT PRENATAL CARE: CPT | Mod: CPTII,S$GLB,, | Performed by: ADVANCED PRACTICE MIDWIFE

## 2023-01-03 PROCEDURE — 0502F PR SUBSEQUENT PRENATAL CARE: ICD-10-PCS | Mod: CPTII,S$GLB,, | Performed by: ADVANCED PRACTICE MIDWIFE

## 2023-01-03 PROCEDURE — 99999 PR PBB SHADOW E&M-EST. PATIENT-LVL III: CPT | Mod: PBBFAC,,, | Performed by: ADVANCED PRACTICE MIDWIFE

## 2023-01-03 PROCEDURE — 36415 COLL VENOUS BLD VENIPUNCTURE: CPT | Mod: PN | Performed by: ADVANCED PRACTICE MIDWIFE

## 2023-01-03 NOTE — PATIENT INSTRUCTIONS
Patient Education       Pregnancy - The Eighth Month   About this topic   It is important for you to learn how to take care of yourself to help you have a healthy baby and safe delivery. It is good to have health care throughout your pregnancy.  The eighth month of your pregnancy starts around week 33 and lasts through week 36. By knowing how far along you are, you can learn what is normal for this stage of your pregnancy and plan for what is next.  General   Your body   During the eighth month of your pregnancy, here are some things you can expect.  You may:  Be feeling more tired. Rest as much as you can and take small naps if possible. This also helps with swollen feet and ankles.  Feel hot all the time. Be sure to drink plenty of water.  Notice your baby drops more into your pelvis. This makes breathing a bit easier, but you may have to go to the bathroom more often. You may also notice more problems with hemorrhoids.  Have more back pain  Notice clear jelly-like substance flecked with blood when you use the restroom. This is your mucus plug.  Feel less steady on your feet. This is because your hips and joints are preparing for birth.  Be tested for Group Beta Strep (GBS) to see if you will need antibiotics during labor  Gain about 1/2 to 1 pound (.23 to .45 kg) a week for the rest of your pregnancy. It is normal to gain about 5 to 10 pounds (2.3 to 4.5 kg) total in your first 4 months.  Have a BPP, or Biophysical Profile. The doctors do an ultrasound to check your babys health if you are at high risk or having problems.  Have a NST, or Non Stress Test. The staff place special monitors around your belly to check the babys heart rate and look for contractions.  Your baby's growth and development:  Your baby is almost fully mature but will spend the rest of the time inside of you getting bigger.  Their lungs are the last organ to mature, so it is important that your baby stays in your womb until your due date if  possible.  Their bones are getting stronger each day. The bones in their skull stay a little softer to make delivery easier.  They are able to scratch themselves as their fingernails are developed.  Your baby is becoming protected from germs as they develop antibodies.  They are moving a little less because there is less room.  Your baby is about 19 inches (48 cm) long and weighs about 6 pounds (2,700 gm). Your baby is about the size of a papaya or pineapple.  Things to Think About   Avoid alcohol, drugs, tobacco products, and second hand smoke.  Be sure you know the signs of labor and when to call your doctor.  Are you planning a natural childbirth or thinking about an epidural? Know things you can do to help cope with labor pain.  You may want to learn about cord blood banking.  Do you have everything you need for after the baby is born? Be sure the car seat fits in the car.  When do I need to call the doctor?   Contractions every 10 minutes or more often that do not go away with drinking water or position changes  Headache that does not go away; blurry vision; seeing spots or halos; increase in swelling in your hands, feet, or face; and pain under your ribs on the right side  Low, dull back pain that does not go away  Pressure in your pelvis that feels like your baby is pushing down  A gush or constant trickle of watery or bloody fluid leaking from your vagina  Little to no movement felt by baby in 2 hours. Your baby should be moving at least 10 times every 2 hours.  Vaginal bleeding with or without pain  Fever of 100.4°F (38°C) or higher  After a car accident, fall, or any trauma to your belly  Having thoughts of harming yourself or others, or do not feel safe at home  Where can I learn more?   American Academy of Family Physicians  https://familydoctor.org/changes-in-your-body-during-pregnancy-third-trimester/   Kids [employer]  http://kidshealth.org/en/parents/pregnancy-calendar-intro.html?WT.ac=p-arsh   Office on  Womens Health  https://www.womenshealth.gov/pregnancy/youre-pregnant-now-what/stages-pregnancy   Last Reviewed Date   2020-05-06  Consumer Information Use and Disclaimer   This information is not specific medical advice and does not replace information you receive from your health care provider. This is only a brief summary of general information. It does NOT include all information about conditions, illnesses, injuries, tests, procedures, treatments, therapies, discharge instructions or life-style choices that may apply to you. You must talk with your health care provider for complete information about your health and treatment options. This information should not be used to decide whether or not to accept your health care providers advice, instructions or recommendations. Only your health care provider has the knowledge and training to provide advice that is right for you.  Copyright   Copyright © 2021 Up[employer]Date, Inc. and its affiliates and/or licensors. All rights reserved.  Patient Education       Fetal Movement   About this topic   Feeling your baby move for the first time is a good sign that your baby is doing well. You may begin to feel these movements between the 18th and 25th weeks of your pregnancy. If this is your first time being pregnant, it may be closer to 25 weeks. Your baby has been moving around before this, but the kicks have not been strong enough for you to feel. During the first weeks of feeling movement, you may start to see a pattern during the day when your baby is most active. You can track your baby's kicks each day at home. This is also known as kick counting. It is a good way to check on your baby's movements and well being.  Most often, fetal kick counting is used in high-risk pregnancies. It may be useful for all pregnancies. Counting and writing down your baby's kicks, jabs, twists, flutters, rolls, turns, flips, and swishes may help find a problem that needs more evaluation. The  "American College of Obstetricians and Gynecologists, or ACOG, suggests that you record how much time it takes you to feel 10 of these movements. Ideally, you should be able to feel 10 movements within 2 hours. Many people will track these movements in much less time.  General   How to Track Your Baby's Kick Counts   Most often your doctor will want you to wait until the 28th to 30th weeks of your pregnancy to start kick counting. Here are some tips to help you get started.  Find the time of day when your baby is most active. For some people, this is right after eating. Others find their baby moving a lot after they have been exercising or more active. Some babies are more active in the evenings when your blood sugar starts to lower.  Try to count kicks at about the same time each day.  Before you start counting, have something to eat or drink. Also take a short walk or do some light activity.  Choose a quiet place where you can focus on your baby's movements. Also get in a comfortable position. Try and lie on one side or the other. You may need to change positions until you find one that works best for you and your baby.  Keep a notebook to track your baby's kicks. Your doctor may give you a chart to use or you can make your own. Write down the date, time you started counting, and the time of each "kick" during a 2-hour period until you have felt 10 kicks.  Once you have recorded 10 kicks within 2 hours you can stop counting.  If you are not able to record 10 movements over 2 hours you should get up and move around or eat something and try again.  If you are not able to record 10 movements over 2 hours the second time, call your doctor. They may want you to go to the hospital to get your baby checked.  When do I need to call the doctor?   You have felt less than 10 movements over a period of 2 hours.  It takes longer each day to record 10 movements.  There is a big change in the pattern of movements you are writing " down.  You feel no movement for 2 hours even after eating a snack, light activity, and position changes.  Teach Back: Helping You Understand   The Teach Back Method helps you understand the information we are giving you. After you talk with the staff, tell them in your own words what you learned. This helps to make sure the staff has described each thing clearly. It also helps to explain things that may have been confusing. Before going home, make sure you can do these:  I can tell you about feeling my baby move.  I can tell you how I will track my babys kicks.  I can tell you what I will do if I feel less than 10 movements in 2 hours, it takes longer to feel my baby move 10 times, or there is a big change in how my baby is moving.  Last Reviewed Date   2021-10-08  Consumer Information Use and Disclaimer   This information is not specific medical advice and does not replace information you receive from your health care provider. This is only a brief summary of general information. It does NOT include all information about conditions, illnesses, injuries, tests, procedures, treatments, therapies, discharge instructions or life-style choices that may apply to you. You must talk with your health care provider for complete information about your health and treatment options. This information should not be used to decide whether or not to accept your health care providers advice, instructions or recommendations. Only your health care provider has the knowledge and training to provide advice that is right for you.  Copyright   Copyright © 2021 [employer], [employer]. and its affiliates and/or licensors. All rights reserved.

## 2023-01-03 NOTE — PROGRESS NOTES
32 y.o. female  at 33w1d   Reports + FM, denies VB, LOF or CTX  Doing well without concerns     History of  with plans to experience that again-aware of precautions    Anemia continue with iron    Checks blood pressure-satisfactory.  PIH precautions discussed.  Trace protein asymptomatic  TW lbs -discussed  Reviewed 28wk lab results (A POS)  Declined Tdap  Breast pump request 3 sent  Reviewed warning signs, normal FKCs,  labor precautions and how/when to call.  RTC x 2 wks, call or present sooner prn.     I spent a total of 20 minutes on the day of the visit.This includes face to face time and non-face to face time preparing to see the patient (eg, review of tests), Obtaining and/or reviewing separately obtained history, Documenting clinical information in the electronic or other health record, Independently interpreting resultsand communicating results to the patient/family/caregiver, or Care coordination.

## 2023-01-04 LAB
GLUCOSE SERPL-MCNC: 112 MG/DL (ref 70–140)
HIV 1+2 AB+HIV1 P24 AG SERPL QL IA: NORMAL
RPR SER QL: NORMAL

## 2023-01-16 ENCOUNTER — PATIENT MESSAGE (OUTPATIENT)
Dept: OTHER | Facility: OTHER | Age: 33
End: 2023-01-16
Payer: COMMERCIAL

## 2023-01-17 ENCOUNTER — ROUTINE PRENATAL (OUTPATIENT)
Dept: OBSTETRICS AND GYNECOLOGY | Facility: CLINIC | Age: 33
End: 2023-01-17
Payer: COMMERCIAL

## 2023-01-17 VITALS
DIASTOLIC BLOOD PRESSURE: 82 MMHG | SYSTOLIC BLOOD PRESSURE: 126 MMHG | BODY MASS INDEX: 33.55 KG/M2 | WEIGHT: 220.69 LBS

## 2023-01-17 DIAGNOSIS — Z34.93 NORMAL PREGNANCY IN THIRD TRIMESTER: Primary | ICD-10-CM

## 2023-01-17 DIAGNOSIS — Z98.891 HISTORY OF VBAC: ICD-10-CM

## 2023-01-17 PROBLEM — O99.013 ANEMIA AFFECTING PREGNANCY IN THIRD TRIMESTER: Status: ACTIVE | Noted: 2022-09-19

## 2023-01-17 PROCEDURE — 99999 PR PBB SHADOW E&M-EST. PATIENT-LVL III: ICD-10-PCS | Mod: PBBFAC,,, | Performed by: ADVANCED PRACTICE MIDWIFE

## 2023-01-17 PROCEDURE — 99999 PR PBB SHADOW E&M-EST. PATIENT-LVL III: CPT | Mod: PBBFAC,,, | Performed by: ADVANCED PRACTICE MIDWIFE

## 2023-01-17 PROCEDURE — 0502F PR SUBSEQUENT PRENATAL CARE: ICD-10-PCS | Mod: CPTII,S$GLB,, | Performed by: ADVANCED PRACTICE MIDWIFE

## 2023-01-17 PROCEDURE — 0502F SUBSEQUENT PRENATAL CARE: CPT | Mod: CPTII,S$GLB,, | Performed by: ADVANCED PRACTICE MIDWIFE

## 2023-01-17 NOTE — PROGRESS NOTES
32 y.o. female  at 35w1d   Reports + FM, denies VB, LOF or regular CTX  Doing well without concerns     History of successful  desiring  again-approved  TW lbs   GBS next visit with ultrasound  Reviewed warning signs, normal FKCs, labor precautions and how/when to call.  RTC x 1 wks, call or present sooner prn.     I spent a total of 20 minutes on the day of the visit.This includes face to face time and non-face to face time preparing to see the patient (eg, review of tests), Obtaining and/or reviewing separately obtained history, Documenting clinical information in the electronic or other health record, Independently interpreting resultsand communicating results to the patient/family/caregiver, or Care coordination.       4th contact,Pt has not scheduled colonoscopy.  Pt final letter mailed.

## 2023-01-24 ENCOUNTER — PROCEDURE VISIT (OUTPATIENT)
Dept: OBSTETRICS AND GYNECOLOGY | Facility: CLINIC | Age: 33
End: 2023-01-24
Payer: COMMERCIAL

## 2023-01-24 ENCOUNTER — ROUTINE PRENATAL (OUTPATIENT)
Dept: OBSTETRICS AND GYNECOLOGY | Facility: CLINIC | Age: 33
End: 2023-01-24
Payer: COMMERCIAL

## 2023-01-24 VITALS
DIASTOLIC BLOOD PRESSURE: 80 MMHG | SYSTOLIC BLOOD PRESSURE: 120 MMHG | BODY MASS INDEX: 33.69 KG/M2 | WEIGHT: 221.56 LBS

## 2023-01-24 DIAGNOSIS — Z34.93 NORMAL PREGNANCY IN THIRD TRIMESTER: ICD-10-CM

## 2023-01-24 DIAGNOSIS — Z34.93 NORMAL PREGNANCY IN THIRD TRIMESTER: Primary | ICD-10-CM

## 2023-01-24 DIAGNOSIS — Z98.891 HISTORY OF VBAC: ICD-10-CM

## 2023-01-24 PROCEDURE — 76816 OB US FOLLOW-UP PER FETUS: CPT | Mod: S$GLB,,, | Performed by: OBSTETRICS & GYNECOLOGY

## 2023-01-24 PROCEDURE — 76819 US OB/GYN PROCEDURE (VIEWPOINT): ICD-10-PCS | Mod: S$GLB,,, | Performed by: OBSTETRICS & GYNECOLOGY

## 2023-01-24 PROCEDURE — 76819 FETAL BIOPHYS PROFIL W/O NST: CPT | Mod: S$GLB,,, | Performed by: OBSTETRICS & GYNECOLOGY

## 2023-01-24 PROCEDURE — 99999 PR PBB SHADOW E&M-EST. PATIENT-LVL III: ICD-10-PCS | Mod: PBBFAC,,, | Performed by: ADVANCED PRACTICE MIDWIFE

## 2023-01-24 PROCEDURE — 87081 CULTURE SCREEN ONLY: CPT | Performed by: ADVANCED PRACTICE MIDWIFE

## 2023-01-24 PROCEDURE — 0502F PR SUBSEQUENT PRENATAL CARE: ICD-10-PCS | Mod: CPTII,S$GLB,, | Performed by: ADVANCED PRACTICE MIDWIFE

## 2023-01-24 PROCEDURE — 0502F SUBSEQUENT PRENATAL CARE: CPT | Mod: CPTII,S$GLB,, | Performed by: ADVANCED PRACTICE MIDWIFE

## 2023-01-24 PROCEDURE — 76816 US OB/GYN PROCEDURE (VIEWPOINT): ICD-10-PCS | Mod: S$GLB,,, | Performed by: OBSTETRICS & GYNECOLOGY

## 2023-01-24 PROCEDURE — 99999 PR PBB SHADOW E&M-EST. PATIENT-LVL III: CPT | Mod: PBBFAC,,, | Performed by: ADVANCED PRACTICE MIDWIFE

## 2023-01-24 NOTE — PROGRESS NOTES
32 y.o. female  at 36w1d  Reports + FM, denies VB, LOF or regular CTX  Doing well-complaining of increasing discomfort in lower limb secondary to increasing edema which is noted today, worsens with work.  This started last week and has started rest to help alleviate problem and complaint.  The papers completed today    Ultrasound-vertex, MVP 5.2 centimeters, EFW 5 pound 9 ounces at 16 percentile with AC at 17 percentile.  BPP 8 8-reassuring    TW lbs   GBS collected today cervix posterior but starting to soften  The skin of the suprapubic region was evaluated and appears clean and dry.  Counseled the patient to shower daily and to wash this area with an antibacterial soap such as Dial daily.  Advised her to not shave the hair from this area from now until after delivery.  I also counseled the patient to place antibacterial hand soap in all her bathrooms and kitchen at home to help facilitate proper hand hygiene practices before and after delivery.   Reviewed warning signs, normal FKCs, labor precautions and how/when to call.  RTC x 1 wks, call or present sooner prn.     I spent a total of 20 minutes on the day of the visit.This includes face to face time and non-face to face time preparing to see the patient (eg, review of tests), Obtaining and/or reviewing separately obtained history, Documenting clinical information in the electronic or other health record, Independently interpreting resultsand communicating results to the patient/family/caregiver, or Care coordination.

## 2023-01-27 LAB — BACTERIA SPEC AEROBE CULT: NORMAL

## 2023-01-30 ENCOUNTER — TELEPHONE (OUTPATIENT)
Dept: OBSTETRICS AND GYNECOLOGY | Facility: CLINIC | Age: 33
End: 2023-01-30
Payer: COMMERCIAL

## 2023-01-30 NOTE — TELEPHONE ENCOUNTER
----- Message from Marie Chauhan sent at 1/30/2023  8:36 AM CST -----  Contact: self  Pt is asking for an return call in reference to her paperwork , please call back at .[phone] Thx CJ

## 2023-01-31 ENCOUNTER — ROUTINE PRENATAL (OUTPATIENT)
Dept: OBSTETRICS AND GYNECOLOGY | Facility: CLINIC | Age: 33
End: 2023-01-31
Payer: COMMERCIAL

## 2023-01-31 VITALS
SYSTOLIC BLOOD PRESSURE: 122 MMHG | BODY MASS INDEX: 33.42 KG/M2 | WEIGHT: 219.81 LBS | DIASTOLIC BLOOD PRESSURE: 76 MMHG

## 2023-01-31 DIAGNOSIS — Z98.891 HISTORY OF VBAC: ICD-10-CM

## 2023-01-31 DIAGNOSIS — O99.013 ANEMIA AFFECTING PREGNANCY IN THIRD TRIMESTER: ICD-10-CM

## 2023-01-31 DIAGNOSIS — Z34.93 NORMAL PREGNANCY IN THIRD TRIMESTER: Primary | ICD-10-CM

## 2023-01-31 PROCEDURE — 0502F SUBSEQUENT PRENATAL CARE: CPT | Mod: CPTII,S$GLB,, | Performed by: ADVANCED PRACTICE MIDWIFE

## 2023-01-31 PROCEDURE — 99999 PR PBB SHADOW E&M-EST. PATIENT-LVL III: CPT | Mod: PBBFAC,,, | Performed by: ADVANCED PRACTICE MIDWIFE

## 2023-01-31 PROCEDURE — 0502F PR SUBSEQUENT PRENATAL CARE: ICD-10-PCS | Mod: CPTII,S$GLB,, | Performed by: ADVANCED PRACTICE MIDWIFE

## 2023-01-31 PROCEDURE — 99999 PR PBB SHADOW E&M-EST. PATIENT-LVL III: ICD-10-PCS | Mod: PBBFAC,,, | Performed by: ADVANCED PRACTICE MIDWIFE

## 2023-02-07 ENCOUNTER — ROUTINE PRENATAL (OUTPATIENT)
Dept: OBSTETRICS AND GYNECOLOGY | Facility: CLINIC | Age: 33
End: 2023-02-07
Payer: COMMERCIAL

## 2023-02-07 VITALS — DIASTOLIC BLOOD PRESSURE: 82 MMHG | BODY MASS INDEX: 34.36 KG/M2 | WEIGHT: 226 LBS | SYSTOLIC BLOOD PRESSURE: 128 MMHG

## 2023-02-07 DIAGNOSIS — Z98.891 HISTORY OF VBAC: ICD-10-CM

## 2023-02-07 DIAGNOSIS — Z34.93 NORMAL PREGNANCY IN THIRD TRIMESTER: Primary | ICD-10-CM

## 2023-02-07 PROCEDURE — 99999 PR PBB SHADOW E&M-EST. PATIENT-LVL III: ICD-10-PCS | Mod: PBBFAC,,, | Performed by: ADVANCED PRACTICE MIDWIFE

## 2023-02-07 PROCEDURE — 99999 PR PBB SHADOW E&M-EST. PATIENT-LVL III: CPT | Mod: PBBFAC,,, | Performed by: ADVANCED PRACTICE MIDWIFE

## 2023-02-07 PROCEDURE — 0502F SUBSEQUENT PRENATAL CARE: CPT | Mod: CPTII,S$GLB,, | Performed by: ADVANCED PRACTICE MIDWIFE

## 2023-02-07 PROCEDURE — 0502F PR SUBSEQUENT PRENATAL CARE: ICD-10-PCS | Mod: CPTII,S$GLB,, | Performed by: ADVANCED PRACTICE MIDWIFE

## 2023-02-07 NOTE — PROGRESS NOTES
32 y.o. female  at 38w1d   Reports + FM, denies VB, LOF or regular CTX  Doing well without concerns       History of successful  desiring  again which has been approved.      Continues to experience lower limb edema with 3+ ankle malleus and 2+ pretibial.  Advised to continue with rest until delivery with some exercise for circulation.  Continue to monitor blood pressure at home.  Presently normotensive and asymptomatic.  Letter is provided for work  TW lbs   Reviewed warning signs, normal FKCs, labor precautions and how/when to call.  RTC x 1 wks, call or present sooner prn.     I spent a total of 20 minutes on the day of the visit.This includes face to face time and non-face to face time preparing to see the patient (eg, review of tests), Obtaining and/or reviewing separately obtained history, Documenting clinical information in the electronic or other health record, Independently interpreting resultsand communicating results to the patient/family/caregiver, or Care coordination.

## 2023-02-07 NOTE — PATIENT INSTRUCTIONS
Patient Education       Pregnancy - The Ninth Month   About this topic   It is important for you to learn how to take care of yourself to help you have a healthy baby and safe delivery. It is good to have health care throughout your pregnancy.  The ninth month of your pregnancy starts around week 37 and lasts through delivery. By knowing how far along you are, you can learn what is normal for this stage of your pregnancy and plan for what is next.  General   Your body   During the ninth month of your pregnancy, here are some things you can expect.  You may:  Lose your mucous plug as your cervix starts to get thinner and dilate.  Notice a small amount of streaky red or pink spotting.  Your doctor may discuss stripping your membranes to help the labor progress.  Go into labor any time. Most women deliver their baby between 38 and 42 weeks.  Notice your belly button sticks out. It should go back to normal after you give birth.  Have a bit of extra energy as you get ready for your baby  Notice your breasts are leaking more. This is normal as your body is making the first milk you can feed your baby.  Have tests to check on how your baby is doing. Your doctor will most likely not let you be pregnant for more than 42 weeks.  Not gain any weight this month. Some mothers even lose 1 to 2 pounds (.45 to .9 kg).  Your baby's growth and development:  Your baby has been busy swallowing fluid and building up waste products for their first bowel movement.  Your baby may start sucking their thumb.  They may come out with dry skin, bruises, or a misshapen head. Living in a watery fluid and going through labor is tough on your baby too. These are all normal and will change in the first weeks of life.  Your baby may have lots of hair on their head or not very much at all. Long fingernails are normal.  Your baby is about 20 inches (51 cm) long and weighs about 7 1/2 pounds (3,400 gm). Your baby is about the size of a  watermelon.  Things to Think About   Avoid alcohol, drugs, tobacco products, and second hand smoke.  Talk to your doctor if you plan to travel or get on a plane.  Have your bag packed so you are ready for delivery.  Are you planning a natural childbirth or thinking about an epidural? Know things you can do to help cope with labor pain.  If you are having a boy, decide if you want to have him circumcised.  Be sure the car seat is installed the right way so you are ready to bring your baby home.  When do I need to call the doctor?   Contractions every 5 minutes or more often that do not go away with rest, drinking water, or position changes  Headache that does not go away; blurry vision; seeing spots or halos; increase in swelling in your hands, feet, or face; and pain under your ribs on the right side  Low, dull back pain that does not go away  Pressure in your pelvis that feels like your baby is pushing down  A gush or constant trickle of watery or bloody fluid leaking from your vagina  Little to no movement felt by baby in 2 hours. Your baby should be moving at least 10 times every 2 hours.  Vaginal bleeding with or without pain  Fever of 100.4°F (38°C) or higher  After a car accident, fall, or any trauma to your belly  Having thoughts of harming yourself or others, or do not feel safe at home  Where can I learn more?   American Academy of Family Physicians  https://familydoctor.org/changes-in-your-body-during-pregnancy-third-trimester/   Kids Health  http://kidshealth.org/en/parents/pregnancy-calendar-intro.html?WT.ac=p-rash   Office on Womens Health  https://www.womenshealth.gov/pregnancy/youre-pregnant-now-what/stages-pregnancy   Last Reviewed Date   2020-05-06  Consumer Information Use and Disclaimer   This information is not specific medical advice and does not replace information you receive from your health care provider. This is only a brief summary of general information. It does NOT include all  information about conditions, illnesses, injuries, tests, procedures, treatments, therapies, discharge instructions or life-style choices that may apply to you. You must talk with your health care provider for complete information about your health and treatment options. This information should not be used to decide whether or not to accept your health care providers advice, instructions or recommendations. Only your health care provider has the knowledge and training to provide advice that is right for you.  Copyright   Copyright © 2021 [employer] and its affiliates and/or licensors. All rights reserved.  Patient Education       Fetal Movement   About this topic   Feeling your baby move for the first time is a good sign that your baby is doing well. You may begin to feel these movements between the 18th and 25th weeks of your pregnancy. If this is your first time being pregnant, it may be closer to 25 weeks. Your baby has been moving around before this, but the kicks have not been strong enough for you to feel. During the first weeks of feeling movement, you may start to see a pattern during the day when your baby is most active. You can track your baby's kicks each day at home. This is also known as kick counting. It is a good way to check on your baby's movements and well being.  Most often, fetal kick counting is used in high-risk pregnancies. It may be useful for all pregnancies. Counting and writing down your baby's kicks, jabs, twists, flutters, rolls, turns, flips, and swishes may help find a problem that needs more evaluation. The American College of Obstetricians and Gynecologists, or ACOG, suggests that you record how much time it takes you to feel 10 of these movements. Ideally, you should be able to feel 10 movements within 2 hours. Many people will track these movements in much less time.  General   How to Track Your Baby's Kick Counts   Most often your doctor will want you to wait until the 28th  "to 30th weeks of your pregnancy to start kick counting. Here are some tips to help you get started.  Find the time of day when your baby is most active. For some people, this is right after eating. Others find their baby moving a lot after they have been exercising or more active. Some babies are more active in the evenings when your blood sugar starts to lower.  Try to count kicks at about the same time each day.  Before you start counting, have something to eat or drink. Also take a short walk or do some light activity.  Choose a quiet place where you can focus on your baby's movements. Also get in a comfortable position. Try and lie on one side or the other. You may need to change positions until you find one that works best for you and your baby.  Keep a notebook to track your baby's kicks. Your doctor may give you a chart to use or you can make your own. Write down the date, time you started counting, and the time of each "kick" during a 2-hour period until you have felt 10 kicks.  Once you have recorded 10 kicks within 2 hours you can stop counting.  If you are not able to record 10 movements over 2 hours you should get up and move around or eat something and try again.  If you are not able to record 10 movements over 2 hours the second time, call your doctor. They may want you to go to the hospital to get your baby checked.  When do I need to call the doctor?   You have felt less than 10 movements over a period of 2 hours.  It takes longer each day to record 10 movements.  There is a big change in the pattern of movements you are writing down.  You feel no movement for 2 hours even after eating a snack, light activity, and position changes.  Teach Back: Helping You Understand   The Teach Back Method helps you understand the information we are giving you. After you talk with the staff, tell them in your own words what you learned. This helps to make sure the staff has described each thing clearly. It also " helps to explain things that may have been confusing. Before going home, make sure you can do these:  I can tell you about feeling my baby move.  I can tell you how I will track my babys kicks.  I can tell you what I will do if I feel less than 10 movements in 2 hours, it takes longer to feel my baby move 10 times, or there is a big change in how my baby is moving.  Last Reviewed Date   2021-10-08  Consumer Information Use and Disclaimer   This information is not specific medical advice and does not replace information you receive from your health care provider. This is only a brief summary of general information. It does NOT include all information about conditions, illnesses, injuries, tests, procedures, treatments, therapies, discharge instructions or life-style choices that may apply to you. You must talk with your health care provider for complete information about your health and treatment options. This information should not be used to decide whether or not to accept your health care providers advice, instructions or recommendations. Only your health care provider has the knowledge and training to provide advice that is right for you.  Copyright   Copyright © 2021 [employer] and its affiliates and/or licensors. All rights reserved.

## 2023-02-07 NOTE — LETTER
Endocrine/General Surgery  [address] [place] [address]  Phone: [phone]  Fax: [phone] February 7, 2023     Patient: Yee Adler   YOB: 1990   Date of Visit: 2/7/2023       To whom it may concern,    I saw Yee today in clinic.Yee has been under my care since 7/6/22 for her pregnancy.    She had recently been experiencing increasing lower limb edema and discomfort associated with work and was advised to rest on 01/09/2023 with some improvement  Monitoring her blood pressure at home and attending clinic weekly for consistent evaluation.  She is advised to continue with this plan of care until delivery  If you have any questions or concerns, please don't hesitate to contact my office. Thank you for accomodating Yee during this time of her treatment.        Sincerely,        Mounika Sofia, AMANDA        CC    No Recipients

## 2023-02-14 ENCOUNTER — ROUTINE PRENATAL (OUTPATIENT)
Dept: OBSTETRICS AND GYNECOLOGY | Facility: CLINIC | Age: 33
End: 2023-02-14
Payer: COMMERCIAL

## 2023-02-14 VITALS
DIASTOLIC BLOOD PRESSURE: 80 MMHG | SYSTOLIC BLOOD PRESSURE: 140 MMHG | WEIGHT: 227.06 LBS | BODY MASS INDEX: 34.53 KG/M2

## 2023-02-14 DIAGNOSIS — Z98.891 HISTORY OF VBAC: ICD-10-CM

## 2023-02-14 DIAGNOSIS — O99.013 ANEMIA AFFECTING PREGNANCY IN THIRD TRIMESTER: ICD-10-CM

## 2023-02-14 DIAGNOSIS — Z34.93 NORMAL PREGNANCY IN THIRD TRIMESTER: Primary | ICD-10-CM

## 2023-02-14 DIAGNOSIS — O48.0 POST-TERM PREGNANCY, 40-42 WEEKS OF GESTATION: ICD-10-CM

## 2023-02-14 PROCEDURE — 99999 PR PBB SHADOW E&M-EST. PATIENT-LVL III: CPT | Mod: PBBFAC,,, | Performed by: ADVANCED PRACTICE MIDWIFE

## 2023-02-14 PROCEDURE — 99999 PR PBB SHADOW E&M-EST. PATIENT-LVL III: ICD-10-PCS | Mod: PBBFAC,,, | Performed by: ADVANCED PRACTICE MIDWIFE

## 2023-02-14 PROCEDURE — 0502F SUBSEQUENT PRENATAL CARE: CPT | Mod: CPTII,S$GLB,, | Performed by: ADVANCED PRACTICE MIDWIFE

## 2023-02-14 PROCEDURE — 0502F PR SUBSEQUENT PRENATAL CARE: ICD-10-PCS | Mod: CPTII,S$GLB,, | Performed by: ADVANCED PRACTICE MIDWIFE

## 2023-02-14 NOTE — PROGRESS NOTES
32 y.o. female  at 39w1d   Reports + FM, denies VB, LOF or regular CTX  Doing well without concerns     History of successful  desiring  again which has been approved aware of induction restrictions  TW lbs   GBS negative.  Cervix is posterior starting to soften and efface  Reviewed warning signs, normal FKCs, labor precautions and how/when to call.  RTC x 1 wks, with ultrasound secondary to 40-42 weeks if undelivered call or present sooner prn.     I spent a total of 20 minutes on the day of the visit.This includes face to face time and non-face to face time preparing to see the patient (eg, review of tests), Obtaining and/or reviewing separately obtained history, Documenting clinical information in the electronic or other health record, Independently interpreting resultsand communicating results to the patient/family/caregiver, or Care coordination.

## 2023-02-14 NOTE — PATIENT INSTRUCTIONS
Patient Education       Pregnancy - The Ninth Month   About this topic   It is important for you to learn how to take care of yourself to help you have a healthy baby and safe delivery. It is good to have health care throughout your pregnancy.  The ninth month of your pregnancy starts around week 37 and lasts through delivery. By knowing how far along you are, you can learn what is normal for this stage of your pregnancy and plan for what is next.  General   Your body   During the ninth month of your pregnancy, here are some things you can expect.  You may:  Lose your mucous plug as your cervix starts to get thinner and dilate.  Notice a small amount of streaky red or pink spotting.  Your doctor may discuss stripping your membranes to help the labor progress.  Go into labor any time. Most women deliver their baby between 38 and 42 weeks.  Notice your belly button sticks out. It should go back to normal after you give birth.  Have a bit of extra energy as you get ready for your baby  Notice your breasts are leaking more. This is normal as your body is making the first milk you can feed your baby.  Have tests to check on how your baby is doing. Your doctor will most likely not let you be pregnant for more than 42 weeks.  Not gain any weight this month. Some mothers even lose 1 to 2 pounds (.45 to .9 kg).  Your baby's growth and development:  Your baby has been busy swallowing fluid and building up waste products for their first bowel movement.  Your baby may start sucking their thumb.  They may come out with dry skin, bruises, or a misshapen head. Living in a watery fluid and going through labor is tough on your baby too. These are all normal and will change in the first weeks of life.  Your baby may have lots of hair on their head or not very much at all. Long fingernails are normal.  Your baby is about 20 inches (51 cm) long and weighs about 7 1/2 pounds (3,400 gm). Your baby is about the size of a  watermelon.  Things to Think About   Avoid alcohol, drugs, tobacco products, and second hand smoke.  Talk to your doctor if you plan to travel or get on a plane.  Have your bag packed so you are ready for delivery.  Are you planning a natural childbirth or thinking about an epidural? Know things you can do to help cope with labor pain.  If you are having a boy, decide if you want to have him circumcised.  Be sure the car seat is installed the right way so you are ready to bring your baby home.  When do I need to call the doctor?   Contractions every 5 minutes or more often that do not go away with rest, drinking water, or position changes  Headache that does not go away; blurry vision; seeing spots or halos; increase in swelling in your hands, feet, or face; and pain under your ribs on the right side  Low, dull back pain that does not go away  Pressure in your pelvis that feels like your baby is pushing down  A gush or constant trickle of watery or bloody fluid leaking from your vagina  Little to no movement felt by baby in 2 hours. Your baby should be moving at least 10 times every 2 hours.  Vaginal bleeding with or without pain  Fever of 100.4°F (38°C) or higher  After a car accident, fall, or any trauma to your belly  Having thoughts of harming yourself or others, or do not feel safe at home  Where can I learn more?   American Academy of Family Physicians  https://familydoctor.org/changes-in-your-body-during-pregnancy-third-trimester/   Kids Health  http://kidshealth.org/en/parents/pregnancy-calendar-intro.html?WT.ac=p-arsh   Office on Womens Health  https://www.womenshealth.gov/pregnancy/youre-pregnant-now-what/stages-pregnancy   Last Reviewed Date   2020-05-06  Consumer Information Use and Disclaimer   This information is not specific medical advice and does not replace information you receive from your health care provider. This is only a brief summary of general information. It does NOT include all  information about conditions, illnesses, injuries, tests, procedures, treatments, therapies, discharge instructions or life-style choices that may apply to you. You must talk with your health care provider for complete information about your health and treatment options. This information should not be used to decide whether or not to accept your health care providers advice, instructions or recommendations. Only your health care provider has the knowledge and training to provide advice that is right for you.  Copyright   Copyright © 2021 [employer]ToDate, Inc. and its affiliates and/or licensors. All rights reserved.  Patient Education       Fetal Movement   About this topic   Feeling your baby move for the first time is a good sign that your baby is doing well. You may begin to feel these movements between the 18th and 25th weeks of your pregnancy. If this is your first time being pregnant, it may be closer to 25 weeks. Your baby has been moving around before this, but the kicks have not been strong enough for you to feel. During the first weeks of feeling movement, you may start to see a pattern during the day when your baby is most active. You can track your baby's kicks each day at home. This is also known as kick counting. It is a good way to check on your baby's movements and well being.  Most often, fetal kick counting is used in high-risk pregnancies. It may be useful for all pregnancies. Counting and writing down your baby's kicks, jabs, twists, flutters, rolls, turns, flips, and swishes may help find a problem that needs more evaluation. The American College of Obstetricians and Gynecologists, or ACOG, suggests that you record how much time it takes you to feel 10 of these movements. Ideally, you should be able to feel 10 movements within 2 hours. Many people will track these movements in much less time.  General   How to Track Your Baby's Kick Counts   Most often your doctor will want you to wait until the 28th  "to 30th weeks of your pregnancy to start kick counting. Here are some tips to help you get started.  Find the time of day when your baby is most active. For some people, this is right after eating. Others find their baby moving a lot after they have been exercising or more active. Some babies are more active in the evenings when your blood sugar starts to lower.  Try to count kicks at about the same time each day.  Before you start counting, have something to eat or drink. Also take a short walk or do some light activity.  Choose a quiet place where you can focus on your baby's movements. Also get in a comfortable position. Try and lie on one side or the other. You may need to change positions until you find one that works best for you and your baby.  Keep a notebook to track your baby's kicks. Your doctor may give you a chart to use or you can make your own. Write down the date, time you started counting, and the time of each "kick" during a 2-hour period until you have felt 10 kicks.  Once you have recorded 10 kicks within 2 hours you can stop counting.  If you are not able to record 10 movements over 2 hours you should get up and move around or eat something and try again.  If you are not able to record 10 movements over 2 hours the second time, call your doctor. They may want you to go to the hospital to get your baby checked.  When do I need to call the doctor?   You have felt less than 10 movements over a period of 2 hours.  It takes longer each day to record 10 movements.  There is a big change in the pattern of movements you are writing down.  You feel no movement for 2 hours even after eating a snack, light activity, and position changes.  Teach Back: Helping You Understand   The Teach Back Method helps you understand the information we are giving you. After you talk with the staff, tell them in your own words what you learned. This helps to make sure the staff has described each thing clearly. It also " helps to explain things that may have been confusing. Before going home, make sure you can do these:  I can tell you about feeling my baby move.  I can tell you how I will track my babys kicks.  I can tell you what I will do if I feel less than 10 movements in 2 hours, it takes longer to feel my baby move 10 times, or there is a big change in how my baby is moving.  Last Reviewed Date   2021-10-08  Consumer Information Use and Disclaimer   This information is not specific medical advice and does not replace information you receive from your health care provider. This is only a brief summary of general information. It does NOT include all information about conditions, illnesses, injuries, tests, procedures, treatments, therapies, discharge instructions or life-style choices that may apply to you. You must talk with your health care provider for complete information about your health and treatment options. This information should not be used to decide whether or not to accept your health care providers advice, instructions or recommendations. Only your health care provider has the knowledge and training to provide advice that is right for you.  Copyright   Copyright © 2021 [employer] and its affiliates and/or licensors. All rights reserved.

## 2023-02-15 ENCOUNTER — TELEPHONE (OUTPATIENT)
Dept: OBSTETRICS AND GYNECOLOGY | Facility: CLINIC | Age: 33
End: 2023-02-15
Payer: COMMERCIAL

## 2023-02-15 NOTE — TELEPHONE ENCOUNTER
----- Message from Buffyrobert Montoya sent at 2/15/2023 10:22 AM CST -----  Pt stated her mucus plug came out and a little blood and part of her bowels was dark. Call back number is .[phone]. Thx. EL

## 2023-02-15 NOTE — TELEPHONE ENCOUNTER
"Called patient and she stated she had pelvic exam yesterday and last night has some spotting on tissue and again today as well as a "mucousy discharge".  Patient also noted some blood in stool and parts of stool "dark".    Advised patient if any leaking of fluid and bleeding (not spotting) or contraction 5 minutes apart or less to go to L&D.  Advised message would be forwarded to JF for any further advice and patient verbalized understanding.  "

## 2023-02-16 ENCOUNTER — HOSPITAL ENCOUNTER (INPATIENT)
Facility: HOSPITAL | Age: 33
LOS: 3 days | Discharge: HOME | End: 2023-02-19
Attending: OBSTETRICS & GYNECOLOGY | Admitting: OBSTETRICS & GYNECOLOGY
Payer: COMMERCIAL

## 2023-02-16 ENCOUNTER — NURSE TRIAGE (OUTPATIENT)
Dept: ADMINISTRATIVE | Facility: CLINIC | Age: 33
End: 2023-02-16
Payer: COMMERCIAL

## 2023-02-16 ENCOUNTER — ANESTHESIA EVENT (OUTPATIENT)
Dept: OBSTETRICS AND GYNECOLOGY | Facility: HOSPITAL | Age: 33
End: 2023-02-16
Payer: COMMERCIAL

## 2023-02-16 ENCOUNTER — ANESTHESIA (OUTPATIENT)
Dept: OBSTETRICS AND GYNECOLOGY | Facility: HOSPITAL | Age: 33
End: 2023-02-16
Payer: COMMERCIAL

## 2023-02-16 DIAGNOSIS — Z34.93 NORMAL PREGNANCY IN THIRD TRIMESTER: ICD-10-CM

## 2023-02-16 DIAGNOSIS — O41.03X0 OLIGOHYDRAMNIOS IN THIRD TRIMESTER, SINGLE OR UNSPECIFIED FETUS: ICD-10-CM

## 2023-02-16 DIAGNOSIS — R10.9 ABDOMINAL PAIN DURING PREGNANCY IN THIRD TRIMESTER: Primary | ICD-10-CM

## 2023-02-16 DIAGNOSIS — O26.893 ABDOMINAL PAIN DURING PREGNANCY IN THIRD TRIMESTER: Primary | ICD-10-CM

## 2023-02-16 DIAGNOSIS — Z98.891 STATUS POST REPEAT LOW TRANSVERSE CESAREAN SECTION: ICD-10-CM

## 2023-02-16 DIAGNOSIS — O41.03X9: ICD-10-CM

## 2023-02-16 PROBLEM — O14.13 SEVERE PRE-ECLAMPSIA IN THIRD TRIMESTER: Status: ACTIVE | Noted: 2023-02-16

## 2023-02-16 LAB
ABO + RH BLD: NORMAL
ALBUMIN SERPL BCP-MCNC: 3 G/DL (ref 3.5–5.2)
ALP SERPL-CCNC: 131 U/L (ref 55–135)
ALT SERPL W/O P-5'-P-CCNC: 23 U/L (ref 10–44)
ANION GAP SERPL CALC-SCNC: 12 MMOL/L (ref 8–16)
AST SERPL-CCNC: 25 U/L (ref 10–40)
BASOPHILS # BLD AUTO: 0.02 K/UL (ref 0–0.2)
BASOPHILS # BLD AUTO: 0.05 K/UL (ref 0–0.2)
BASOPHILS NFR BLD: 0.3 % (ref 0–1.9)
BASOPHILS NFR BLD: 0.3 % (ref 0–1.9)
BILIRUB SERPL-MCNC: 0.2 MG/DL (ref 0.1–1)
BLD GP AB SCN CELLS X3 SERPL QL: NORMAL
BUN SERPL-MCNC: 8 MG/DL (ref 6–20)
CALCIUM SERPL-MCNC: 9.1 MG/DL (ref 8.7–10.5)
CHLORIDE SERPL-SCNC: 108 MMOL/L (ref 95–110)
CO2 SERPL-SCNC: 16 MMOL/L (ref 23–29)
CREAT SERPL-MCNC: 0.7 MG/DL (ref 0.5–1.4)
CREAT UR-MCNC: 23.1 MG/DL (ref 15–325)
DIFFERENTIAL METHOD: ABNORMAL
DIFFERENTIAL METHOD: ABNORMAL
EOSINOPHIL # BLD AUTO: 0 K/UL (ref 0–0.5)
EOSINOPHIL # BLD AUTO: 0.1 K/UL (ref 0–0.5)
EOSINOPHIL NFR BLD: 0.3 % (ref 0–8)
EOSINOPHIL NFR BLD: 0.9 % (ref 0–8)
ERYTHROCYTE [DISTWIDTH] IN BLOOD BY AUTOMATED COUNT: 15.3 % (ref 11.5–14.5)
ERYTHROCYTE [DISTWIDTH] IN BLOOD BY AUTOMATED COUNT: 15.6 % (ref 11.5–14.5)
EST. GFR  (NO RACE VARIABLE): >60 ML/MIN/1.73 M^2
GLUCOSE SERPL-MCNC: 86 MG/DL (ref 70–110)
HCT VFR BLD AUTO: 27.6 % (ref 37–48.5)
HCT VFR BLD AUTO: 29.9 % (ref 37–48.5)
HGB BLD-MCNC: 9 G/DL (ref 12–16)
HGB BLD-MCNC: 9.7 G/DL (ref 12–16)
IMM GRANULOCYTES # BLD AUTO: 0.05 K/UL (ref 0–0.04)
IMM GRANULOCYTES # BLD AUTO: 0.09 K/UL (ref 0–0.04)
IMM GRANULOCYTES NFR BLD AUTO: 0.6 % (ref 0–0.5)
IMM GRANULOCYTES NFR BLD AUTO: 0.7 % (ref 0–0.5)
LYMPHOCYTES # BLD AUTO: 1.6 K/UL (ref 1–4.8)
LYMPHOCYTES # BLD AUTO: 2.4 K/UL (ref 1–4.8)
LYMPHOCYTES NFR BLD: 16.7 % (ref 18–48)
LYMPHOCYTES NFR BLD: 22.4 % (ref 18–48)
MCH RBC QN AUTO: 23.1 PG (ref 27–31)
MCH RBC QN AUTO: 23.2 PG (ref 27–31)
MCHC RBC AUTO-ENTMCNC: 32.4 G/DL (ref 32–36)
MCHC RBC AUTO-ENTMCNC: 32.6 G/DL (ref 32–36)
MCV RBC AUTO: 71 FL (ref 82–98)
MCV RBC AUTO: 71 FL (ref 82–98)
MONOCYTES # BLD AUTO: 0.6 K/UL (ref 0.3–1)
MONOCYTES # BLD AUTO: 1.1 K/UL (ref 0.3–1)
MONOCYTES NFR BLD: 7.7 % (ref 4–15)
MONOCYTES NFR BLD: 7.8 % (ref 4–15)
NEUTROPHILS # BLD AUTO: 10.7 K/UL (ref 1.8–7.7)
NEUTROPHILS # BLD AUTO: 4.8 K/UL (ref 1.8–7.7)
NEUTROPHILS NFR BLD: 67.9 % (ref 38–73)
NEUTROPHILS NFR BLD: 74.4 % (ref 38–73)
NRBC BLD-RTO: 0 /100 WBC
NRBC BLD-RTO: 0 /100 WBC
PLATELET # BLD AUTO: 191 K/UL (ref 150–450)
PLATELET # BLD AUTO: 193 K/UL (ref 150–450)
PMV BLD AUTO: 11 FL (ref 9.2–12.9)
PMV BLD AUTO: 11.2 FL (ref 9.2–12.9)
POTASSIUM SERPL-SCNC: 3.8 MMOL/L (ref 3.5–5.1)
PROT SERPL-MCNC: 7.1 G/DL (ref 6–8.4)
PROT UR-MCNC: 11 MG/DL (ref 0–15)
PROT/CREAT UR: 0.48 MG/G{CREAT} (ref 0–0.2)
RBC # BLD AUTO: 3.88 M/UL (ref 4–5.4)
RBC # BLD AUTO: 4.2 M/UL (ref 4–5.4)
SODIUM SERPL-SCNC: 136 MMOL/L (ref 136–145)
WBC # BLD AUTO: 14.34 K/UL (ref 3.9–12.7)
WBC # BLD AUTO: 7.05 K/UL (ref 3.9–12.7)

## 2023-02-16 PROCEDURE — 27200688 HC TRAY, SPINAL-HYPER/ ISOBARIC: Performed by: STUDENT IN AN ORGANIZED HEALTH CARE EDUCATION/TRAINING PROGRAM

## 2023-02-16 PROCEDURE — 85025 COMPLETE CBC W/AUTO DIFF WBC: CPT | Mod: 91 | Performed by: OBSTETRICS & GYNECOLOGY

## 2023-02-16 PROCEDURE — 25000003 PHARM REV CODE 250: Performed by: NURSE ANESTHETIST, CERTIFIED REGISTERED

## 2023-02-16 PROCEDURE — 36004725 HC OB OR TIME LEV III - EA ADD 15 MIN: Performed by: OBSTETRICS & GYNECOLOGY

## 2023-02-16 PROCEDURE — 80053 COMPREHEN METABOLIC PANEL: CPT | Performed by: MIDWIFE

## 2023-02-16 PROCEDURE — 59510 CESAREAN DELIVERY: CPT | Mod: GB,,, | Performed by: OBSTETRICS & GYNECOLOGY

## 2023-02-16 PROCEDURE — 59514 PR CESAREAN DELIVERY ONLY: ICD-10-PCS | Mod: AS,GB,, | Performed by: PHYSICIAN ASSISTANT

## 2023-02-16 PROCEDURE — 86900 BLOOD TYPING SEROLOGIC ABO: CPT | Performed by: MIDWIFE

## 2023-02-16 PROCEDURE — 71000039 HC RECOVERY, EACH ADD'L HOUR: Performed by: OBSTETRICS & GYNECOLOGY

## 2023-02-16 PROCEDURE — 84156 ASSAY OF PROTEIN URINE: CPT | Performed by: MIDWIFE

## 2023-02-16 PROCEDURE — 11000001 HC ACUTE MED/SURG PRIVATE ROOM

## 2023-02-16 PROCEDURE — 99211 OFF/OP EST MAY X REQ PHY/QHP: CPT

## 2023-02-16 PROCEDURE — 99213 OFFICE O/P EST LOW 20 MIN: CPT | Mod: ,,, | Performed by: MIDWIFE

## 2023-02-16 PROCEDURE — 59514 CESAREAN DELIVERY ONLY: CPT | Mod: AS,GB,, | Performed by: PHYSICIAN ASSISTANT

## 2023-02-16 PROCEDURE — 99213 PR OFFICE/OUTPT VISIT, EST, LEVL III, 20-29 MIN: ICD-10-PCS | Mod: ,,, | Performed by: MIDWIFE

## 2023-02-16 PROCEDURE — 63600175 PHARM REV CODE 636 W HCPCS: Performed by: MIDWIFE

## 2023-02-16 PROCEDURE — 27000181 HC CABLE, IUPC

## 2023-02-16 PROCEDURE — 85025 COMPLETE CBC W/AUTO DIFF WBC: CPT | Performed by: MIDWIFE

## 2023-02-16 PROCEDURE — 71000033 HC RECOVERY, INTIAL HOUR: Performed by: OBSTETRICS & GYNECOLOGY

## 2023-02-16 PROCEDURE — 51702 INSERT TEMP BLADDER CATH: CPT

## 2023-02-16 PROCEDURE — 25000003 PHARM REV CODE 250: Performed by: MIDWIFE

## 2023-02-16 PROCEDURE — 62322 NJX INTERLAMINAR LMBR/SAC: CPT | Performed by: NURSE ANESTHETIST, CERTIFIED REGISTERED

## 2023-02-16 PROCEDURE — 37000009 HC ANESTHESIA EA ADD 15 MINS: Performed by: OBSTETRICS & GYNECOLOGY

## 2023-02-16 PROCEDURE — 59510 PR FULL ROUT OBSTE CARE,CESAREAN DELIV: ICD-10-PCS | Mod: GB,,, | Performed by: OBSTETRICS & GYNECOLOGY

## 2023-02-16 PROCEDURE — 27201127 HC VACUUM EXTRACTOR

## 2023-02-16 PROCEDURE — 63600175 PHARM REV CODE 636 W HCPCS: Performed by: NURSE ANESTHETIST, CERTIFIED REGISTERED

## 2023-02-16 PROCEDURE — 37000008 HC ANESTHESIA 1ST 15 MINUTES: Performed by: OBSTETRICS & GYNECOLOGY

## 2023-02-16 PROCEDURE — 25000003 PHARM REV CODE 250: Performed by: OBSTETRICS & GYNECOLOGY

## 2023-02-16 PROCEDURE — 36004724 HC OB OR TIME LEV III - 1ST 15 MIN: Performed by: OBSTETRICS & GYNECOLOGY

## 2023-02-16 PROCEDURE — 63600175 PHARM REV CODE 636 W HCPCS: Performed by: OBSTETRICS & GYNECOLOGY

## 2023-02-16 RX ORDER — PRENATAL WITH FERROUS FUM AND FOLIC ACID 3080; 920; 120; 400; 22; 1.84; 3; 20; 10; 1; 12; 200; 27; 25; 2 [IU]/1; [IU]/1; MG/1; [IU]/1; MG/1; MG/1; MG/1; MG/1; MG/1; MG/1; UG/1; MG/1; MG/1; MG/1; MG/1
1 TABLET ORAL DAILY
Status: DISCONTINUED | OUTPATIENT
Start: 2023-02-17 | End: 2023-02-19 | Stop reason: HOSPADM

## 2023-02-16 RX ORDER — DIPHENHYDRAMINE HCL 25 MG
25 CAPSULE ORAL EVERY 4 HOURS PRN
Status: DISCONTINUED | OUTPATIENT
Start: 2023-02-16 | End: 2023-02-19 | Stop reason: HOSPADM

## 2023-02-16 RX ORDER — IBUPROFEN 800 MG/1
800 TABLET ORAL EVERY 8 HOURS
Status: DISCONTINUED | OUTPATIENT
Start: 2023-02-18 | End: 2023-02-19 | Stop reason: HOSPADM

## 2023-02-16 RX ORDER — MAGNESIUM SULFATE HEPTAHYDRATE 40 MG/ML
2 INJECTION, SOLUTION INTRAVENOUS CONTINUOUS
Status: DISCONTINUED | OUTPATIENT
Start: 2023-02-16 | End: 2023-02-19 | Stop reason: HOSPADM

## 2023-02-16 RX ORDER — CEFAZOLIN SODIUM 2 G/50ML
2 SOLUTION INTRAVENOUS ONCE AS NEEDED
Status: DISCONTINUED | OUTPATIENT
Start: 2023-02-16 | End: 2023-02-16

## 2023-02-16 RX ORDER — CEFAZOLIN SODIUM 1 G/3ML
INJECTION, POWDER, FOR SOLUTION INTRAMUSCULAR; INTRAVENOUS
Status: DISCONTINUED | OUTPATIENT
Start: 2023-02-16 | End: 2023-02-16

## 2023-02-16 RX ORDER — KETOROLAC TROMETHAMINE 30 MG/ML
INJECTION, SOLUTION INTRAMUSCULAR; INTRAVENOUS
Status: DISCONTINUED | OUTPATIENT
Start: 2023-02-16 | End: 2023-02-16

## 2023-02-16 RX ORDER — ONDANSETRON 8 MG/1
8 TABLET, ORALLY DISINTEGRATING ORAL EVERY 8 HOURS PRN
Status: DISCONTINUED | OUTPATIENT
Start: 2023-02-16 | End: 2023-02-19 | Stop reason: HOSPADM

## 2023-02-16 RX ORDER — FENTANYL CITRATE 50 UG/ML
INJECTION, SOLUTION INTRAMUSCULAR; INTRAVENOUS
Status: DISCONTINUED | OUTPATIENT
Start: 2023-02-16 | End: 2023-02-16

## 2023-02-16 RX ORDER — KETOROLAC TROMETHAMINE 30 MG/ML
30 INJECTION, SOLUTION INTRAMUSCULAR; INTRAVENOUS EVERY 6 HOURS
Status: DISPENSED | OUTPATIENT
Start: 2023-02-16 | End: 2023-02-17

## 2023-02-16 RX ORDER — MISOPROSTOL 200 UG/1
800 TABLET ORAL ONCE AS NEEDED
Status: DISCONTINUED | OUTPATIENT
Start: 2023-02-16 | End: 2023-02-19 | Stop reason: HOSPADM

## 2023-02-16 RX ORDER — SODIUM CHLORIDE, SODIUM LACTATE, POTASSIUM CHLORIDE, CALCIUM CHLORIDE 600; 310; 30; 20 MG/100ML; MG/100ML; MG/100ML; MG/100ML
INJECTION, SOLUTION INTRAVENOUS CONTINUOUS
Status: DISCONTINUED | OUTPATIENT
Start: 2023-02-16 | End: 2023-02-19 | Stop reason: HOSPADM

## 2023-02-16 RX ORDER — CALCIUM GLUCONATE 98 MG/ML
1 INJECTION, SOLUTION INTRAVENOUS
Status: DISCONTINUED | OUTPATIENT
Start: 2023-02-16 | End: 2023-02-19 | Stop reason: HOSPADM

## 2023-02-16 RX ORDER — LANOLIN ALCOHOL/MO/W.PET/CERES
1 CREAM (GRAM) TOPICAL DAILY
Status: DISCONTINUED | OUTPATIENT
Start: 2023-02-17 | End: 2023-02-19 | Stop reason: HOSPADM

## 2023-02-16 RX ORDER — DEXMEDETOMIDINE HYDROCHLORIDE 100 UG/ML
INJECTION, SOLUTION INTRAVENOUS
Status: DISCONTINUED | OUTPATIENT
Start: 2023-02-16 | End: 2023-02-16

## 2023-02-16 RX ORDER — HYDROCODONE BITARTRATE AND ACETAMINOPHEN 10; 325 MG/1; MG/1
1 TABLET ORAL EVERY 4 HOURS PRN
Status: DISCONTINUED | OUTPATIENT
Start: 2023-02-16 | End: 2023-02-19 | Stop reason: HOSPADM

## 2023-02-16 RX ORDER — MAGNESIUM SULFATE HEPTAHYDRATE 40 MG/ML
4 INJECTION, SOLUTION INTRAVENOUS ONCE
Status: COMPLETED | OUTPATIENT
Start: 2023-02-16 | End: 2023-02-16

## 2023-02-16 RX ORDER — TRANEXAMIC ACID 10 MG/ML
1000 INJECTION, SOLUTION INTRAVENOUS ONCE AS NEEDED
Status: DISCONTINUED | OUTPATIENT
Start: 2023-02-16 | End: 2023-02-19 | Stop reason: HOSPADM

## 2023-02-16 RX ORDER — HYDROMORPHONE HYDROCHLORIDE 2 MG/ML
1 INJECTION, SOLUTION INTRAMUSCULAR; INTRAVENOUS; SUBCUTANEOUS EVERY 4 HOURS PRN
Status: DISCONTINUED | OUTPATIENT
Start: 2023-02-16 | End: 2023-02-19 | Stop reason: HOSPADM

## 2023-02-16 RX ORDER — OXYTOCIN/RINGER'S LACTATE 30/500 ML
334 PLASTIC BAG, INJECTION (ML) INTRAVENOUS ONCE
Status: COMPLETED | OUTPATIENT
Start: 2023-02-16 | End: 2023-02-16

## 2023-02-16 RX ORDER — SODIUM CITRATE AND CITRIC ACID MONOHYDRATE 334; 500 MG/5ML; MG/5ML
30 SOLUTION ORAL
Status: DISCONTINUED | OUTPATIENT
Start: 2023-02-16 | End: 2023-02-16

## 2023-02-16 RX ORDER — SODIUM CHLORIDE, SODIUM LACTATE, POTASSIUM CHLORIDE, CALCIUM CHLORIDE 600; 310; 30; 20 MG/100ML; MG/100ML; MG/100ML; MG/100ML
INJECTION, SOLUTION INTRAVENOUS CONTINUOUS
Status: DISCONTINUED | OUTPATIENT
Start: 2023-02-16 | End: 2023-02-16

## 2023-02-16 RX ORDER — MISOPROSTOL 200 UG/1
800 TABLET ORAL
Status: DISCONTINUED | OUTPATIENT
Start: 2023-02-16 | End: 2023-02-16

## 2023-02-16 RX ORDER — ONDANSETRON 2 MG/ML
INJECTION INTRAMUSCULAR; INTRAVENOUS
Status: DISCONTINUED | OUTPATIENT
Start: 2023-02-16 | End: 2023-02-16

## 2023-02-16 RX ORDER — ACETAMINOPHEN 325 MG/1
650 TABLET ORAL EVERY 6 HOURS PRN
Status: DISCONTINUED | OUTPATIENT
Start: 2023-02-16 | End: 2023-02-19 | Stop reason: HOSPADM

## 2023-02-16 RX ORDER — LABETALOL HYDROCHLORIDE 5 MG/ML
20 INJECTION, SOLUTION INTRAVENOUS ONCE AS NEEDED
Status: COMPLETED | OUTPATIENT
Start: 2023-02-16 | End: 2023-02-16

## 2023-02-16 RX ORDER — SODIUM CHLORIDE 0.9 % (FLUSH) 0.9 %
10 SYRINGE (ML) INJECTION
Status: DISCONTINUED | OUTPATIENT
Start: 2023-02-16 | End: 2023-02-19 | Stop reason: HOSPADM

## 2023-02-16 RX ORDER — HYDROCODONE BITARTRATE AND ACETAMINOPHEN 5; 325 MG/1; MG/1
1 TABLET ORAL EVERY 4 HOURS PRN
Status: DISCONTINUED | OUTPATIENT
Start: 2023-02-16 | End: 2023-02-19 | Stop reason: HOSPADM

## 2023-02-16 RX ORDER — CARBOPROST TROMETHAMINE 250 UG/ML
250 INJECTION, SOLUTION INTRAMUSCULAR
Status: DISCONTINUED | OUTPATIENT
Start: 2023-02-16 | End: 2023-02-16

## 2023-02-16 RX ORDER — OXYTOCIN/RINGER'S LACTATE 30/500 ML
95 PLASTIC BAG, INJECTION (ML) INTRAVENOUS ONCE AS NEEDED
Status: DISCONTINUED | OUTPATIENT
Start: 2023-02-16 | End: 2023-02-19 | Stop reason: HOSPADM

## 2023-02-16 RX ORDER — METHYLERGONOVINE MALEATE 0.2 MG/ML
200 INJECTION INTRAVENOUS
Status: DISCONTINUED | OUTPATIENT
Start: 2023-02-16 | End: 2023-02-19 | Stop reason: HOSPADM

## 2023-02-16 RX ORDER — OXYTOCIN/RINGER'S LACTATE 30/500 ML
95 PLASTIC BAG, INJECTION (ML) INTRAVENOUS ONCE
Status: DISCONTINUED | OUTPATIENT
Start: 2023-02-16 | End: 2023-02-16

## 2023-02-16 RX ORDER — LABETALOL HYDROCHLORIDE 5 MG/ML
40 INJECTION, SOLUTION INTRAVENOUS ONCE AS NEEDED
Status: DISCONTINUED | OUTPATIENT
Start: 2023-02-16 | End: 2023-02-19 | Stop reason: HOSPADM

## 2023-02-16 RX ORDER — AMMONIA 15 % (W/V)
0.3 AMPUL (EA) INHALATION CONTINUOUS PRN
Status: DISCONTINUED | OUTPATIENT
Start: 2023-02-16 | End: 2023-02-19 | Stop reason: HOSPADM

## 2023-02-16 RX ORDER — METHYLERGONOVINE MALEATE 0.2 MG/ML
200 INJECTION INTRAVENOUS
Status: DISCONTINUED | OUTPATIENT
Start: 2023-02-16 | End: 2023-02-16

## 2023-02-16 RX ORDER — OXYTOCIN/RINGER'S LACTATE 30/500 ML
334 PLASTIC BAG, INJECTION (ML) INTRAVENOUS ONCE AS NEEDED
Status: DISCONTINUED | OUTPATIENT
Start: 2023-02-16 | End: 2023-02-19 | Stop reason: HOSPADM

## 2023-02-16 RX ORDER — LABETALOL HYDROCHLORIDE 5 MG/ML
80 INJECTION, SOLUTION INTRAVENOUS ONCE AS NEEDED
Status: DISCONTINUED | OUTPATIENT
Start: 2023-02-16 | End: 2023-02-19 | Stop reason: HOSPADM

## 2023-02-16 RX ORDER — MORPHINE SULFATE 1 MG/ML
INJECTION, SOLUTION EPIDURAL; INTRATHECAL; INTRAVENOUS
Status: DISCONTINUED | OUTPATIENT
Start: 2023-02-16 | End: 2023-02-16

## 2023-02-16 RX ORDER — OXYTOCIN 10 [USP'U]/ML
10 INJECTION, SOLUTION INTRAMUSCULAR; INTRAVENOUS ONCE AS NEEDED
Status: DISCONTINUED | OUTPATIENT
Start: 2023-02-16 | End: 2023-02-19 | Stop reason: HOSPADM

## 2023-02-16 RX ORDER — DIPHENHYDRAMINE HYDROCHLORIDE 50 MG/ML
25 INJECTION INTRAMUSCULAR; INTRAVENOUS EVERY 4 HOURS PRN
Status: DISCONTINUED | OUTPATIENT
Start: 2023-02-16 | End: 2023-02-19 | Stop reason: HOSPADM

## 2023-02-16 RX ORDER — CARBOPROST TROMETHAMINE 250 UG/ML
250 INJECTION, SOLUTION INTRAMUSCULAR
Status: DISCONTINUED | OUTPATIENT
Start: 2023-02-16 | End: 2023-02-19 | Stop reason: HOSPADM

## 2023-02-16 RX ORDER — BUPIVACAINE HYDROCHLORIDE 7.5 MG/ML
INJECTION, SOLUTION EPIDURAL; RETROBULBAR
Status: DISCONTINUED | OUTPATIENT
Start: 2023-02-16 | End: 2023-02-16

## 2023-02-16 RX ORDER — OXYTOCIN/RINGER'S LACTATE 30/500 ML
95 PLASTIC BAG, INJECTION (ML) INTRAVENOUS ONCE
Status: DISCONTINUED | OUTPATIENT
Start: 2023-02-16 | End: 2023-02-19 | Stop reason: HOSPADM

## 2023-02-16 RX ORDER — PROCHLORPERAZINE EDISYLATE 5 MG/ML
5 INJECTION INTRAMUSCULAR; INTRAVENOUS EVERY 6 HOURS PRN
Status: DISCONTINUED | OUTPATIENT
Start: 2023-02-16 | End: 2023-02-19 | Stop reason: HOSPADM

## 2023-02-16 RX ORDER — DOCUSATE SODIUM 100 MG/1
100 CAPSULE, LIQUID FILLED ORAL DAILY
Status: DISCONTINUED | OUTPATIENT
Start: 2023-02-16 | End: 2023-02-19 | Stop reason: HOSPADM

## 2023-02-16 RX ORDER — HYDRALAZINE HYDROCHLORIDE 20 MG/ML
10 INJECTION INTRAMUSCULAR; INTRAVENOUS ONCE AS NEEDED
Status: DISCONTINUED | OUTPATIENT
Start: 2023-02-16 | End: 2023-02-19 | Stop reason: HOSPADM

## 2023-02-16 RX ORDER — PHENYLEPHRINE HYDROCHLORIDE 10 MG/ML
INJECTION INTRAVENOUS
Status: DISCONTINUED | OUTPATIENT
Start: 2023-02-16 | End: 2023-02-16

## 2023-02-16 RX ADMIN — ONDANSETRON 8 MG: 2 INJECTION INTRAMUSCULAR; INTRAVENOUS at 01:02

## 2023-02-16 RX ADMIN — BUPIVACAINE HYDROCHLORIDE 1.6 ML: 7.5 INJECTION, SOLUTION EPIDURAL; RETROBULBAR at 02:02

## 2023-02-16 RX ADMIN — PHENYLEPHRINE HYDROCHLORIDE 100 MCG: 10 INJECTION INTRAVENOUS at 02:02

## 2023-02-16 RX ADMIN — Medication 334 ML/HR: at 02:02

## 2023-02-16 RX ADMIN — PHENYLEPHRINE HYDROCHLORIDE 30 MCG/MIN: 10 INJECTION INTRAVENOUS at 02:02

## 2023-02-16 RX ADMIN — MAGNESIUM SULFATE IN WATER 2 G/HR: 40 INJECTION, SOLUTION INTRAVENOUS at 06:02

## 2023-02-16 RX ADMIN — MAGNESIUM SULFATE HEPTAHYDRATE 4 G: 40 INJECTION, SOLUTION INTRAVENOUS at 06:02

## 2023-02-16 RX ADMIN — DEXMEDETOMIDINE 5 MCG: 100 INJECTION, SOLUTION, CONCENTRATE INTRAVENOUS at 02:02

## 2023-02-16 RX ADMIN — KETOROLAC TROMETHAMINE 30 MG: 30 INJECTION, SOLUTION INTRAMUSCULAR; INTRAVENOUS at 03:02

## 2023-02-16 RX ADMIN — ONDANSETRON 8 MG: 8 TABLET, ORALLY DISINTEGRATING ORAL at 09:02

## 2023-02-16 RX ADMIN — MORPHINE SULFATE 0.15 MG: 1 INJECTION, SOLUTION EPIDURAL; INTRATHECAL; INTRAVENOUS at 02:02

## 2023-02-16 RX ADMIN — SODIUM CHLORIDE, POTASSIUM CHLORIDE, SODIUM LACTATE AND CALCIUM CHLORIDE: 600; 310; 30; 20 INJECTION, SOLUTION INTRAVENOUS at 10:02

## 2023-02-16 RX ADMIN — CEFAZOLIN 2 G: 330 INJECTION, POWDER, FOR SOLUTION INTRAMUSCULAR; INTRAVENOUS at 02:02

## 2023-02-16 RX ADMIN — AZITHROMYCIN MONOHYDRATE 500 MG: 500 INJECTION, POWDER, LYOPHILIZED, FOR SOLUTION INTRAVENOUS at 10:02

## 2023-02-16 RX ADMIN — LABETALOL HYDROCHLORIDE 20 MG: 5 INJECTION INTRAVENOUS at 06:02

## 2023-02-16 RX ADMIN — FENTANYL CITRATE 50 MCG: 50 INJECTION, SOLUTION INTRAMUSCULAR; INTRAVENOUS at 02:02

## 2023-02-16 RX ADMIN — SODIUM CHLORIDE, SODIUM LACTATE, POTASSIUM CHLORIDE, AND CALCIUM CHLORIDE: .6; .31; .03; .02 INJECTION, SOLUTION INTRAVENOUS at 01:02

## 2023-02-16 RX ADMIN — SODIUM CHLORIDE, POTASSIUM CHLORIDE, SODIUM LACTATE AND CALCIUM CHLORIDE: 600; 310; 30; 20 INJECTION, SOLUTION INTRAVENOUS at 06:02

## 2023-02-16 NOTE — HPI
33 yo, , 39w 3d, presents to unit w/ c/o abdominal pain and sharp shooting pain in her vagina, spotting also after she was checked in the clinic

## 2023-02-16 NOTE — INTERVAL H&P NOTE
The patient has been examined and the H&P has been reviewed:    I concur with the findings and changes have been noted since the H&P was written: nonreactive NST, no decels, moderate variability, BPP 6/8 for low fluid, discussed w/ pt due to closed cervix and dx of oligo recommend RCS. Will ad azithromycin along w/ ancef 2 gm, Dr Becker spoke w/ pt and agreed to proceed w/ RCS, consents signed         Active Hospital Problems    Diagnosis  POA    *Oligohydramnios in third trimester [O41.03X0]  Yes    Abdominal pain during pregnancy in third trimester [O26.893, R10.9]  Yes      Resolved Hospital Problems   No resolved problems to display.

## 2023-02-16 NOTE — PLAN OF CARE
[facility] - Labor & Delivery  Discharge Assessment    Primary Care Provider: Primary Doctor No     OB Screen (most recent)       OB Screen - [id number] 1505          OB SCREEN    Assessment Type Discharge Planning Assessment     Source of Information patient;health record     Received Prenatal Care Yes     Any indications/suspicions for None     Is this a teen pregnancy No     Is the baby in NICU No     Indication for adoption/[facility] No     Indication for DME/post-acute needs No     HIV (+) No     Any congenital  disorders No     Fetal demise/ death No

## 2023-02-16 NOTE — PLAN OF CARE

## 2023-02-16 NOTE — SUBJECTIVE & OBJECTIVE
Obstetric HPI:  Patient reports active fetal movement, No vaginal bleeding , No loss of fluid     This pregnancy has been complicated by anemia, previous c/s, hx of successful     OB History    Para Term  AB Living   3 2 2 0 0 2   SAB IAB Ectopic Multiple Live Births   0 0 0 0 2      # Outcome Date GA Lbr Jose G/2nd Weight Sex Delivery Anes PTL Lv   3 Current            2 Term 16 41w3d  3.13 kg (6 lb 14.4 oz) M Vag-Spont None N NILO      Apgar1: 9  Apgar5: 9   1 Term 10/06/14 41w0d  3.419 kg (7 lb 8.6 oz) M CS-LTranv Spinal N NILO      Apgar1: 5  Apgar5: 9     Past Medical History:   Diagnosis Date    Anemia 2014     Past Surgical History:   Procedure Laterality Date     SECTION      WISDOM TOOTH EXTRACTION         PTA Medications   Medication Sig    ferrous sulfate 325 (65 FE) MG EC tablet Take 1 tablet (325 mg total) by mouth 2 (two) times daily.    prenatal 25/iron fum/folic/dha (PRENATAL-1 ORAL) Take by mouth.       Review of patient's allergies indicates:   Allergen Reactions    Eggs-apples-oats [nutritional supplement-fiber] Other (See Comments)     Lip reaction        Family History       Problem Relation (Age of Onset)    Diabetes Mother    Hypertension Mother    Stroke Maternal Grandmother          Tobacco Use    Smoking status: Never     Passive exposure: Never    Smokeless tobacco: Never   Substance and Sexual Activity    Alcohol use: No    Drug use: No    Sexual activity: Yes     Partners: Male     Birth control/protection: None     Review of Systems   Eyes:  Negative for visual disturbance.   Gastrointestinal:  Positive for abdominal pain.   Genitourinary:  Positive for vaginal pain.   Musculoskeletal:  Positive for back pain.   Neurological:  Negative for headaches.    Objective:     Vital Signs (Most Recent):    Vital Signs (24h Range):           There is no height or weight on file to calculate BMI.    FHT: Cat 1 (reassuring)  TOCO:  Irregular ctx's    Physical Exam:    Constitutional: She is oriented to person, place, and time. She appears well-developed and well-nourished.    HENT:   Head: Normocephalic.       Pulmonary/Chest: Effort normal.        Abdominal: Soft.   gravid             Musculoskeletal: Normal range of motion and moves all extremeties.       Neurological: She is alert and oriented to person, place, and time.    Skin: Skin is warm and dry. Capillary refill takes less than 2 seconds.    Psychiatric: She has a normal mood and affect. Her behavior is normal. Judgment and thought content normal.     Cervix:  Dilation:  dimple  Effacement:  50%  Station: -3  Presentation: Vertex     Significant Labs:  Lab Results   Component Value Date    GROUPTRH A POS 07/06/2022    HEPBSAG Negative 07/06/2022    STREPBCULT No Group B Streptococcus isolated 01/24/2023       I have personallly reviewed all pertinent lab results from the last 24 hours.  Recent Lab Results       None

## 2023-02-16 NOTE — L&D DELIVERY NOTE
"[facility] - Labor & Delivery   Section   Operative Note    SUMMARY     Date of Procedure: 2023     Procedure: Procedure(s) (LRB):   SECTION (N/A)    Surgeon(s) and Role:     * Verito Becker MD - Primary    Assistant:  Yuki العلي PA-C, assistance necessary for completion of the surgery    Pre-Operative Diagnosis:   IUP at 39w3d  Oligohydramnios  History of  delivery    Post-Operative Diagnosis:   IUP at 39w3d  Oligohydramnios  History of  delivery    Anesthesia: Spinal/Epidural    Technical Procedures Used: Repeat low transverse  delivery via Pfannensteil skin incision           Description of the Findings of the Procedure: Thick skin scar from prior .  No significant intraabdominal adhesions.  Normal uterus, tubes, and ovaries.  Small amount of clear amniotic fluid.  Male infant "Huy", cephalic presentation, difficult delivery of the fetal head requiring vacuum assistance with no pop offs.  7lb1oz, APGARS 8/8.  Nuchal cord x 1.  3 vessel cord.  Placenta delivered spontaneously, intact    Significant Surgical Tasks Conducted by the Assistant(s), if Applicable: retraction, exposure, skin closure    Complications: No    Blood Loss: * 435 mL *     With patient in supine position, the legs are  and Antony Catheter placed and positioning to supine done.   Abdomen prepped with Chloroprep and 3 minute drying time allowed prior to draping of the abdomen.   Time out taken with OR team members.  Pfannenstiel Incision made through the skin along the inferior and superior edges of prior scar, thick skin scar excised with the Bovie, transverse fascial incision developed, rectus muscles  in the midline and the peritoneum entered.   no adhesions noted.  The lower uterine segment and position of the fetus identified.   Bladder flap taken down through transverse peritoneal incision.    Low Transverse Incision made through well developer lower uterine segment " and extended laterally with blunt dissection.   Clear fluid noted.  Infant delivered from vertex presentation using vacuum assistance due to difficult delivery of the head.  No pop-offs occurred.  Vacuum released following delivery of the head and remainder of the infant delivered easily.  Cord clamped after one minute and  handed to attending nurse.  Cord blood taken, placenta delivered.  The uterus wasnot exteriorized.  The edges of the uterine incision are grasped with Martínez clamps at the angles and the inferior and superior midline edges of the incision.    Closure with running lock 0 Chromic, starting at each angle, tying in the midline.   Observation for bleeding with suture of any bleeding along the hysterotomy line.   With good hemostasis noted, the anterior pelvis is rinsed with sterile saline.   Right and left adnexa with normal anatomy.     Closure of the abdomen with 2 0 Vicryl running of the peritoneum, fascial closure with 0 looped PDS starting at the left angle and tying the knot at the right angle.  Pt experiencing sensation and pain along the incision, so incision was injected with 10 cc of 2% lidocaine.  Subcutaneous closure with 2-0 plain gut interrupted.  Skin closure with 4 0 Monocryl subcuticular.  Wound dressed with Aquasel and pressure dressing.          Specimens:   Specimen (24h ago, onward)      None            Condition: Good    Disposition: PACU - hemodynamically stable.    Attestation: Good         Delivery Information for Boy Yee Adler    Birth information:  YOB: 2025   Time of birth: 2:29 PM   Sex: male   Head Delivery Date/Time: 2023  2:28 PM   Delivery type: , Vacuum (Extractor)   Gestational Age: 39w3d    Delivery Providers    Delivering clinician: Verito Becker MD   Provider Role    Anna Coronel, HARRIS Registered Nurse    Analy Foster RN Registered Nurse    Andrés Morrison CRNA Nurse Anesthetist    Edgar Maloney ST Surgical Tech     Melania Kwok Surgical Tech              Measurements    Weight: 3200 g  Weight (lbs): 7 lb 0.9 oz  Length:          Apgars    Living status:   Apgars:  1 min.:  5 min.:  10 min.:  15 min.:  20 min.:    Skin color:         Heart rate:         Reflex irritability:         Muscle tone:         Respiratory effort:         Total:                  Operative Delivery    Forceps attempted?: No  Vacuum extractor attempted?: Yes  Vacuum type: flat  First attempt time vacuum applied: 2023 14:29:00  First attempt time vacuum removed: 2023 14:29:00  Number of pop offs: 0  Number of pulls with vacuum: 1  Vacuum applied by: DR BUNN  Failed vacuum delivery?: No         Shoulder Dystocia    Shoulder dystocia present?: No           Presentation    Presentation: Vertex  Position: Left Occiput Anterior           Interventions/Resuscitation           Cord    Vessels: 3 vessels  Complications: Nuchal  Nuchal Intervention: reduced  Nuchal Cord Description: loose nuchal cord  Number of Loops: 1  Delayed Cord Clamping?: Yes  Cord Clamped Date/Time: 2023  2:31 PM  Cord Blood Disposition: Discarded       Placenta    Placenta delivery date/time: 2023 1431  Placenta removal: Manual removal  Placenta appearance: Intact  Placenta disposition: Family           Labor Events:       labor: No     Labor Onset Date/Time:         Dilation Complete Date/Time:         Start Pushing Date/Time:         Start Pushing Date/Time:       Rupture Date/Time:            Rupture type:            Fluid Amount:         Fluid Color:         Fluid Odor:         Membrane Status:                  steroids: None     Antibiotics given for GBS: No     Induction:       Indications for induction:        Augmentation:       Indications for augmentation:       Labor complications: None     Additional complications: Oligohydramnios        Cervical ripening:                     Delivery:      Episiotomy:       Indication for Episiotomy:        Perineal Lacerations:   Repaired:      Periurethral Laceration:   Repaired:     Labial Laceration:   Repaired:     Sulcus Laceration:   Repaired:     Vaginal Laceration:   Repaired:     Cervical Laceration:   Repaired:     Repair suture:       Repair # of packets:       Last Value - EBL - Nursing (mL):       Sum - EBL - Nursing (mL): 0     Last Value - EBL - Anesthesia (mL):        Calculated QBL (mL):         Vaginal Sweep Performed:       Surgicount Correct:         Other providers:       Anesthesia    Method: Spinal          Details (if applicable):  Trial of Labor No    Categorization: Repeat    Priority: Routine   Indications for : Repeat Section   Incision Type: low transverse     Additional  information:  Forceps:    Vacuum:    Breech:    Observed anomalies    Other (Comments):

## 2023-02-16 NOTE — TELEPHONE ENCOUNTER
"Pt is 39 weeks pregnant, had appointment this past Tuesday and since then pt has blood on her tissue after urinating.  Pt started with sharp pain yesterday, every 10 minutes when lying down.  Pt states vaginal spotting.  Care advice states to go to L&D now.  Pt verbally understands, all questions answered.  Advised to call back for worsening symptoms.    Reason for Disposition   Vaginal bleeding or spotting    Additional Information   Negative: Passed out (i.e., lost consciousness, collapsed and was not responding)   Negative: Shock suspected (e.g., cold/pale/clammy skin, too weak to stand, low BP, rapid pulse)   Negative: Difficult to awaken or acting confused (e.g., disoriented, slurred speech)   Negative: [1] SEVERE abdominal pain (e.g., excruciating) AND [2] constant AND [3] present > 1 hour   Negative: SEVERE vaginal bleeding (e.g., continuous red blood from vagina, large blood clots)   Negative: Sounds like a life-threatening emergency to the triager   Negative: [1] Vomiting AND [2] contains red blood or black ("coffee ground") material  (Exception: few red streaks in vomit that only happened once)   Negative: [1] SEVERE headache AND [2] not relieved with acetaminophen (e.g., Tylenol)   Negative: MODERATE-SEVERE abdominal pain (e.g., interferes with normal activities, awakens from sleep)    Protocols used: Pregnancy - Abdominal Pain Greater Than 20 Weeks EGA-A-PATRICIA    "

## 2023-02-16 NOTE — PROGRESS NOTES
Presented with lower abdominal pain and discomfort radiating into the vagina.  Cervix closed on exam.    FHT's category 2.  Moderate BTBV.  Has occasional, infrequent, contractions, but did have either variable or late deceleration with each ctxns.  US: vtx, BPP 6/8 (-2 for oligohydramnios, MVP 1.1cm)    Reviewed findings with the patient and discussed need for delivery today via repeat  (oligohydramnios, closed cervix, occasional FHR decelerations).  Pt expressed understanding.  Surgical consents reviewed in detail and signed.  All questions answered.  RN to assist pt with pre-op Hibiclens shower and prep for OR.  Will plan to roll around 1 pm.

## 2023-02-16 NOTE — ANESTHESIA PREPROCEDURE EVALUATION
02/16/2023  Yee Adler is a 32 y.o., female.      Pre-op Assessment    I have reviewed the Patient Summary Reports.     I have reviewed the Nursing Notes. I have reviewed the NPO Status.   I have reviewed the Medications.     Review of Systems  Anesthesia Hx:  History of prior surgery of interest to airway management or planning: Denies Family Hx of Anesthesia complications.   Denies Personal Hx of Anesthesia complications.   Social:  Non-Smoker, No Alcohol Use    Hematology/Oncology:         -- Anemia:       Physical Exam  General: Well nourished, Cooperative, Alert and Oriented    Airway:  Mallampati: II   Mouth Opening: Normal  TM Distance: Normal  Tongue: Normal  Neck ROM: Normal ROM    Dental:  Intact        Anesthesia Plan  Type of Anesthesia, risks & benefits discussed:    Anesthesia Type: Spinal  Intra-op Monitoring Plan: Standard ASA Monitors  Post Op Pain Control Plan: multimodal analgesia and intrathecal opioid  Informed Consent: Informed consent signed with the Patient and all parties understand the risks and agree with anesthesia plan.  All questions answered. Patient consented to blood products? Yes  ASA Score: 2    Ready For Surgery From Anesthesia Perspective.     .

## 2023-02-16 NOTE — OP NOTE
"[facility] - [facility] & Delivery   Section   Operative Note    SUMMARY     Date of Procedure: 2023     Procedure: Procedure(s) (LRB):   SECTION (N/A)    Surgeon(s) and Role:     * Verito Becker MD - Primary    Assistant:  Yuki العلي PA-C, assistance necessary for completion of the surgery    Pre-Operative Diagnosis:   IUP at 39w3d  Oligohydramnios  History of  delivery    Post-Operative Diagnosis:   IUP at 39w3d  Oligohydramnios  History of  delivery    Anesthesia: Spinal/Epidural    Technical Procedures Used: Repeat low transverse  delivery via Pfannensteil skin incision           Description of the Findings of the Procedure: Thick skin scar from prior .  No significant intraabdominal adhesions.  Normal uterus, tubes, and ovaries.  Small amount of clear amniotic fluid.  Male infant "Huy", cephalic presentation, difficult delivery of the fetal head requiring vacuum assistance with no pop offs.  7lb1oz, APGARS 8/8.  Nuchal cord x 1.  3 vessel cord.  Placenta delivered spontaneously, intact    Significant Surgical Tasks Conducted by the Assistant(s), if Applicable: retraction, exposure, skin closure    Complications: No    Blood Loss: * 435 mL *     With patient in supine position, the legs are  and Antony Catheter placed and positioning to supine done.   Abdomen prepped with Chloroprep and 3 minute drying time allowed prior to draping of the abdomen.   Time out taken with OR team members.  Pfannenstiel Incision made through the skin along the inferior and superior edges of prior scar, thick skin scar excised with the Bovie, transverse fascial incision developed, rectus muscles  in the midline and the peritoneum entered.   no adhesions noted.  The lower uterine segment and position of the fetus identified.   Bladder flap taken down through transverse peritoneal incision.    Low Transverse Incision made through well developer lower uterine segment " and extended laterally with blunt dissection.   Clear fluid noted.  Infant delivered from vertex presentation using vacuum assistance due to difficult delivery of the head.  No pop-offs occurred.  Vacuum released following delivery of the head and remainder of the infant delivered easily.  Cord clamped after one minute and  handed to attending nurse.  Cord blood taken, placenta delivered.  The uterus wasnot exteriorized.  The edges of the uterine incision are grasped with Martínez clamps at the angles and the inferior and superior midline edges of the incision.    Closure with running lock 0 Chromic, starting at each angle, tying in the midline.   Observation for bleeding with suture of any bleeding along the hysterotomy line.   With good hemostasis noted, the anterior pelvis is rinsed with sterile saline.   Right and left adnexa with normal anatomy.     Closure of the abdomen with 2 0 Vicryl running of the peritoneum, fascial closure with 0 looped PDS starting at the left angle and tying the knot at the right angle.  Pt experiencing sensation and pain along the incision, so incision was injected with 10 cc of 2% lidocaine.  Subcutaneous closure with 2-0 plain gut interrupted.  Skin closure with 4 0 Monocryl subcuticular.  Wound dressed with Aquasel and pressure dressing.          Specimens:   Specimen (24h ago, onward)      None            Condition: Good    Disposition: PACU - hemodynamically stable.    Attestation: Good

## 2023-02-16 NOTE — ANESTHESIA PROCEDURE NOTES
Spinal    Diagnosis: IUP C section  Patient location during procedure: OB  Start time: 2/16/2023 1:46 PM  Timeout: 2/16/2023 1:45 PM  End time: 2/16/2023 2:06 PM    Staffing  Authorizing Provider: Fredi Winter MD  Performing Provider: Andrés Morrison CRNA    Preanesthetic Checklist  Completed: patient identified, IV checked, risks and benefits discussed, surgical consent, monitors and equipment checked, pre-op evaluation and timeout performed  Spinal Block  Patient position: sitting  Prep: ChloraPrep  Patient monitoring: frequent blood pressure checks and continuous pulse ox  Approach: midline  Location: L4-5  Injection technique: single shot  CSF Fluid: blood-tinged free-flowing CSF  Needle  Needle type: pencil-tip   Needle gauge: 25 G  Needle length: 5 in  Additional Documentation: incremental injection, negative aspiration for heme and no paresthesia on injection  Needle localization: anatomical landmarks  Assessment  Sensory level: T6   Dermatomal levels determined by pinch or prick  Ease of block: moderate  Patient's tolerance of the procedure: comfortable throughout block and no complaints

## 2023-02-16 NOTE — H&P
[facility]  Obstetrics  History & Physical    Patient Name: Yee Adler  MRN: [id number]  Admission Date: 2023  Primary Care Provider: Primary Doctor No    Subjective:     Principal Problem:Abdominal pain during pregnancy in third trimester    History of Present Illness:  33 yo, , 39w 3d, presents to unit w/ c/o abdominal pain and sharp shooting pain in her vagina, spotting also after she was checked in the clinic      Obstetric HPI:  Patient reports active fetal movement, No vaginal bleeding , No loss of fluid     This pregnancy has been complicated by anemia, previous c/s, hx of successful     OB History    Para Term  AB Living   3 2 2 0 0 2   SAB IAB Ectopic Multiple Live Births   0 0 0 0 2      # Outcome Date GA Lbr Jose G/2nd Weight Sex Delivery Anes PTL Lv   3 Current            2 Term 16 41w3d  3.13 kg (6 lb 14.4 oz) M Vag-Spont None N NILO      Apgar1: 9  Apgar5: 9   1 Term 10/06/14 41w0d  3.419 kg (7 lb 8.6 oz) M CS-LTranv Spinal N NILO      Apgar1: 5  Apgar5: 9     Past Medical History:   Diagnosis Date    Anemia 2014     Past Surgical History:   Procedure Laterality Date     SECTION      WISDOM TOOTH EXTRACTION         PTA Medications   Medication Sig    ferrous sulfate 325 (65 FE) MG EC tablet Take 1 tablet (325 mg total) by mouth 2 (two) times daily.    prenatal 25/iron fum/folic/dha (PRENATAL-1 ORAL) Take by mouth.       Review of patient's allergies indicates:   Allergen Reactions    Eggs-apples-oats [nutritional supplement-fiber] Other (See Comments)     Lip reaction        Family History       Problem Relation (Age of Onset)    Diabetes Mother    Hypertension Mother    Stroke Maternal Grandmother          Tobacco Use    Smoking status: Never     Passive exposure: Never    Smokeless tobacco: Never   Substance and Sexual Activity    Alcohol use: No    Drug use: No    Sexual activity: Yes     Partners: Male     Birth  control/protection: None     Review of Systems   Eyes:  Negative for visual disturbance.   Gastrointestinal:  Positive for abdominal pain.   Genitourinary:  Positive for vaginal pain.   Musculoskeletal:  Positive for back pain.   Neurological:  Negative for headaches.    Objective:     Vital Signs (Most Recent):    Vital Signs (24h Range):           There is no height or weight on file to calculate BMI.    FHT: Cat 1 (reassuring)  TOCO:  Irregular ctx's    Physical Exam:   Constitutional: She is oriented to person, place, and time. She appears well-developed and well-nourished.    HENT:   Head: Normocephalic.       Pulmonary/Chest: Effort normal.        Abdominal: Soft.   gravid             Musculoskeletal: Normal range of motion and moves all extremeties.       Neurological: She is alert and oriented to person, place, and time.    Skin: Skin is warm and dry. Capillary refill takes less than 2 seconds.    Psychiatric: She has a normal mood and affect. Her behavior is normal. Judgment and thought content normal.     Cervix:  Dilation:  dimple  Effacement:  50%  Station: -3  Presentation: Vertex     Significant Labs:  Lab Results   Component Value Date    GROUPTRH A POS 2022    HEPBSAG Negative 2022    STREPBCULT No Group B Streptococcus isolated 2023       I have personallly reviewed all pertinent lab results from the last 24 hours.  Recent Lab Results       None          Assessment/Plan:     32 y.o. female  at 39w3d for:    * Abdominal pain during pregnancy in third trimester  Observe  VE-Dimple  NST  D/C home if reactive NST        Librado Hernandez CNM  Obstetrics  [facility]

## 2023-02-17 PROBLEM — O41.03X0 OLIGOHYDRAMNIOS IN THIRD TRIMESTER: Status: RESOLVED | Noted: 2023-02-16 | Resolved: 2023-02-17

## 2023-02-17 PROBLEM — O26.893 ABDOMINAL PAIN DURING PREGNANCY IN THIRD TRIMESTER: Status: RESOLVED | Noted: 2023-02-16 | Resolved: 2023-02-17

## 2023-02-17 PROBLEM — R10.9 ABDOMINAL PAIN DURING PREGNANCY IN THIRD TRIMESTER: Status: RESOLVED | Noted: 2023-02-16 | Resolved: 2023-02-17

## 2023-02-17 PROCEDURE — 11000001 HC ACUTE MED/SURG PRIVATE ROOM

## 2023-02-17 PROCEDURE — 63600175 PHARM REV CODE 636 W HCPCS: Performed by: OBSTETRICS & GYNECOLOGY

## 2023-02-17 PROCEDURE — 99231 PR SUBSEQUENT HOSPITAL CARE,LEVL I: ICD-10-PCS | Mod: GT,,, | Performed by: OBSTETRICS & GYNECOLOGY

## 2023-02-17 PROCEDURE — 25000003 PHARM REV CODE 250: Performed by: OBSTETRICS & GYNECOLOGY

## 2023-02-17 PROCEDURE — 99231 SBSQ HOSP IP/OBS SF/LOW 25: CPT | Mod: GT,,, | Performed by: OBSTETRICS & GYNECOLOGY

## 2023-02-17 RX ADMIN — PRENATAL VITAMINS-IRON FUMARATE 27 MG IRON-FOLIC ACID 0.8 MG TABLET 1 TABLET: at 09:02

## 2023-02-17 RX ADMIN — DOCUSATE SODIUM 100 MG: 100 CAPSULE, LIQUID FILLED ORAL at 09:02

## 2023-02-17 RX ADMIN — FERROUS SULFATE TAB 325 MG (65 MG ELEMENTAL FE) 1 EACH: 325 (65 FE) TAB at 09:02

## 2023-02-17 RX ADMIN — KETOROLAC TROMETHAMINE 30 MG: 30 INJECTION, SOLUTION INTRAMUSCULAR; INTRAVENOUS at 06:02

## 2023-02-17 RX ADMIN — KETOROLAC TROMETHAMINE 30 MG: 30 INJECTION, SOLUTION INTRAMUSCULAR; INTRAVENOUS at 08:02

## 2023-02-17 NOTE — ASSESSMENT & PLAN NOTE
Underwent RCS for oligohydramnios on 2/16/23.  - POD 1  - Tolerating PO  - Pain managed on current regimen  - Antony in place while magnesium is running, not yet ambulating  - Aquacel dressing change today due to drainage  - Doing well

## 2023-02-17 NOTE — NURSING
Pt states she is feeling warm and dizzy. BP elevated, but she is shaking. Bleeding is light. Pad changed. Obtained CBC. Notified Dr Becker about pt status. Obtained manual /100. Instructed to treat BP per Dr PENA

## 2023-02-17 NOTE — PROGRESS NOTES
[facility]  Obstetrics  Postpartum Progress Note    Patient Name: Yee Adler  MRN: [id number]  Admission Date: 2023  Hospital Length of Stay: 1 days  Attending Physician: Verito Becker MD  Primary Care Provider: Primary Doctor No    Subjective:     Principal Problem:Status post repeat low transverse  section    Hospital Course:  Observe  VE-Dimple  NST  BPP with oligohydramnios, MVP 1.1cm.    23: Pt underwent uncomplicated repeat LTCS.  She delivered a viable male infant.  While in recovery, pt with severely elevated BP, headache, and hyperreflexia.  Urine P/C ratio 0.48.  IV antihypertensives for BP control and IV magnesium sulfate for seizure prophylaxis.  23: POD 1. BP improved this AM. Patient reports feeling better since being on the magnesium. Will continue to monitor closely.         Interval History: POD 1 s/p RCS.    She is doing well this morning. She is tolerating a regular diet without nausea or vomiting. She is not voiding spontaneously, jorgensen remains in place. She is not ambulating due to being on magnesium with jorgensen in place. She has passed flatus, and has not a BM. Vaginal bleeding is mild. She denies fever or chills. Abdominal pain is mild and controlled with oral medications. She Is breastfeeding.     Objective:     Vital Signs (Most Recent):  Temp: 98.2 °F (36.8 °C) (23 0000)  Pulse: 91 (23 0600)  Resp: 20 (23 1805)  BP: 128/86 (23 0600)  SpO2: 100 % (23 0600) Vital Signs (24h Range):  Temp:  [97.5 °F (36.4 °C)-98.2 °F (36.8 °C)] 98.2 °F (36.8 °C)  Pulse:  [] 91  Resp:  [18-20] 20  SpO2:  [94 %-100 %] 100 %  BP: ()/() 128/86        There is no height or weight on file to calculate BMI.      Intake/Output Summary (Last 24 hours) at 2023 0805  Last data filed at 2023 0626  Gross per 24 hour   Intake 789.25 ml   Output 4115 ml   Net -3325.75 ml         Significant Labs:  Lab Results   Component  Value Date    GROUPTRH A POS 02/16/2023    HEPBSAG Negative 07/06/2022    STREPBCULT No Group B Streptococcus isolated 01/24/2023     Recent Labs   Lab 02/16/23 1812   HGB 9.0*   HCT 27.6*       I have personallly reviewed all pertinent lab results from the last 24 hours.  Recent Lab Results         02/16/23  1812   02/16/23  1505   02/16/23  1319   02/16/23  1033        Albumin     3.0         Alkaline Phosphatase     131         ALT     23         Anion Gap     12         AST     25         Baso # 0.05       0.02       Basophil % 0.3       0.3       BILIRUBIN TOTAL     0.2  Comment: For infants and newborns, interpretation of results should be based  on gestational age, weight and in agreement with clinical  observations.    Premature Infant recommended reference ranges:  Up to 24 hours.............<8.0 mg/dL  Up to 48 hours............<12.0 mg/dL  3-5 days..................<15.0 mg/dL  6-29 days.................<15.0 mg/dL           BUN     8         Calcium     9.1         Chloride     108         CO2     16         Creatinine     0.7         Creatinine, Urine   23.1           Differential Method Automated       Automated       eGFR     >60         Eos # 0.0       0.1       Eosinophil % 0.3       0.9       Glucose     86         Gran # (ANC) 10.7       4.8       Gran % 74.4       67.9       Group & Rh       A POS       Hematocrit 27.6       29.9       Hemoglobin 9.0       9.7       Immature Grans (Abs) 0.09  Comment: Mild elevation in immature granulocytes is non specific and   can be seen in a variety of conditions including stress response,   acute inflammation, trauma and pregnancy. Correlation with other   laboratory and clinical findings is essential.         0.05  Comment: Mild elevation in immature granulocytes is non specific and   can be seen in a variety of conditions including stress response,   acute inflammation, trauma and pregnancy. Correlation with other   laboratory and clinical findings is  essential.         Immature Granulocytes 0.6       0.7       INDIRECT KUMAR       NEG       Lymph # 2.4       1.6       Lymph % 16.7       22.4       MCH 23.2       23.1       MCHC 32.6       32.4       MCV 71       71       Mono # 1.1       0.6       Mono % 7.7       7.8       MPV 11.0       11.2       nRBC 0       0       Platelets 191       193       Potassium     3.8         Prot/Creat Ratio, Urine   0.48           PROTEIN TOTAL     7.1         Protein, Urine Random   11           RBC 3.88       4.20       RDW 15.3       15.6       Sodium     136         WBC 14.34       7.05               Physical Exam:   Constitutional: She is oriented to person, place, and time. She appears well-developed. No distress.    HENT:   Head: Normocephalic and atraumatic.    Eyes: Conjunctivae are normal. Right eye exhibits no discharge. Left eye exhibits no discharge. No scleral icterus.      Pulmonary/Chest: Effort normal. No respiratory distress.        Abdominal: Soft. She exhibits abdominal incision (Pfannenstiel incision with acquacel dressing in place. Some saturation noted under dressing, will chage today. Appropriately TTP.). She exhibits no distension.   Fundus firm and below the level of the umbilicus     Genitourinary:    Genitourinary Comments: Antony in place             Musculoskeletal: Normal range of motion. No edema.       Neurological: She is alert and oriented to person, place, and time. She displays abnormal reflex (Brisk lower extremity reflexes).    Skin: Skin is warm and dry. She is not diaphoretic.          Assessment/Plan:     32 y.o. female  for:    Oncology  Anemia affecting pregnancy in third trimester  - Iron postpartum    Obstetric  * Status post repeat low transverse  section  Underwent RCS for oligohydramnios on 23.  - POD 1  - Tolerating PO  - Pain managed on current regimen  - Antony in place while magnesium is running, not yet ambulating  - Aquacel dressing change today due to  drainage  - Doing well      Severe pre-eclampsia in third trimester  - Continue Magnesium infusion  - Received one dose of Labetalol yesterday, remains available PRN  - BP improved this morning  - Patient reports feeling better  - Continue to monitor closely        Disposition: As patient meets milestones, will plan to discharge accordingly.    CHICO Sandoval-C  Obstetrics  [facility]

## 2023-02-17 NOTE — ANESTHESIA POSTPROCEDURE EVALUATION
Anesthesia Post Evaluation    Patient: Yee Adler    Procedure(s) Performed: Procedure(s) (LRB):   SECTION (N/A)    Final Anesthesia Type: spinal      Patient location during evaluation: labor & delivery  Patient participation: Yes- Able to Participate  Level of consciousness: awake and alert and oriented  Post-procedure vital signs: reviewed and stable  Pain management: adequate  Airway patency: patent    PONV status at discharge: No PONV  Anesthetic complications: no      Cardiovascular status: blood pressure returned to baseline, stable and hemodynamically stable  Respiratory status: unassisted, room air and spontaneous ventilation  Hydration status: euvolemic  Follow-up not needed.          Vitals Value Taken Time   /86 236   Temp 36.4 °C (97.5 °F) 23 1700   Pulse 67 239   Resp 20 23 1805   SpO2 100 % 23   Vitals shown include unvalidated device data.      Event Time   Out of Recovery 17:28:00         Pain/Luba Score: No data recorded

## 2023-02-17 NOTE — SUBJECTIVE & OBJECTIVE
Interval History: POD 1 s/p RCS.    She is doing well this morning. She is tolerating a regular diet without nausea or vomiting. She is not voiding spontaneously, jorgensen remains in place. She is not ambulating due to being on magnesium with jorgensen in place. She has passed flatus, and has not a BM. Vaginal bleeding is mild. She denies fever or chills. Abdominal pain is mild and controlled with oral medications. She Is breastfeeding.     Objective:     Vital Signs (Most Recent):  Temp: 98.2 °F (36.8 °C) (02/17/23 0000)  Pulse: 91 (02/17/23 0600)  Resp: 20 (02/16/23 1805)  BP: 128/86 (02/17/23 0600)  SpO2: 100 % (02/17/23 0600) Vital Signs (24h Range):  Temp:  [97.5 °F (36.4 °C)-98.2 °F (36.8 °C)] 98.2 °F (36.8 °C)  Pulse:  [] 91  Resp:  [18-20] 20  SpO2:  [94 %-100 %] 100 %  BP: ()/() 128/86        There is no height or weight on file to calculate BMI.      Intake/Output Summary (Last 24 hours) at 2/17/2023 0805  Last data filed at 2/17/2023 0626  Gross per 24 hour   Intake 789.25 ml   Output 4115 ml   Net -3325.75 ml         Significant Labs:  Lab Results   Component Value Date    GROUPTRH A POS 02/16/2023    HEPBSAG Negative 07/06/2022    STREPBCULT No Group B Streptococcus isolated 01/24/2023     Recent Labs   Lab 02/16/23 1812   HGB 9.0*   HCT 27.6*       I have personallly reviewed all pertinent lab results from the last 24 hours.  Recent Lab Results         02/16/23  1812   02/16/23  1505   02/16/23  1319   02/16/23  1033        Albumin     3.0         Alkaline Phosphatase     131         ALT     23         Anion Gap     12         AST     25         Baso # 0.05       0.02       Basophil % 0.3       0.3       BILIRUBIN TOTAL     0.2  Comment: For infants and newborns, interpretation of results should be based  on gestational age, weight and in agreement with clinical  observations.    Premature Infant recommended reference ranges:  Up to 24 hours.............<8.0 mg/dL  Up to 48  hours............<12.0 mg/dL  3-5 days..................<15.0 mg/dL  6-29 days.................<15.0 mg/dL           BUN     8         Calcium     9.1         Chloride     108         CO2     16         Creatinine     0.7         Creatinine, Urine   23.1           Differential Method Automated       Automated       eGFR     >60         Eos # 0.0       0.1       Eosinophil % 0.3       0.9       Glucose     86         Gran # (ANC) 10.7       4.8       Gran % 74.4       67.9       Group & Rh       A POS       Hematocrit 27.6       29.9       Hemoglobin 9.0       9.7       Immature Grans (Abs) 0.09  Comment: Mild elevation in immature granulocytes is non specific and   can be seen in a variety of conditions including stress response,   acute inflammation, trauma and pregnancy. Correlation with other   laboratory and clinical findings is essential.         0.05  Comment: Mild elevation in immature granulocytes is non specific and   can be seen in a variety of conditions including stress response,   acute inflammation, trauma and pregnancy. Correlation with other   laboratory and clinical findings is essential.         Immature Granulocytes 0.6       0.7       INDIRECT KUMAR       NEG       Lymph # 2.4       1.6       Lymph % 16.7       22.4       MCH 23.2       23.1       MCHC 32.6       32.4       MCV 71       71       Mono # 1.1       0.6       Mono % 7.7       7.8       MPV 11.0       11.2       nRBC 0       0       Platelets 191       193       Potassium     3.8         Prot/Creat Ratio, Urine   0.48           PROTEIN TOTAL     7.1         Protein, Urine Random   11           RBC 3.88       4.20       RDW 15.3       15.6       Sodium     136         WBC 14.34       7.05               Physical Exam:   Constitutional: She is oriented to person, place, and time. She appears well-developed. No distress.    HENT:   Head: Normocephalic and atraumatic.    Eyes: Conjunctivae are normal. Right eye exhibits no discharge. Left  eye exhibits no discharge. No scleral icterus.      Pulmonary/Chest: Effort normal. No respiratory distress.        Abdominal: Soft. She exhibits abdominal incision (Pfannenstiel incision with acquacel dressing in place. Some saturation noted under dressing, will chage today. Appropriately TTP.). She exhibits no distension.   Fundus firm and below the level of the umbilicus     Genitourinary:    Genitourinary Comments: Antony in place             Musculoskeletal: Normal range of motion. No edema.       Neurological: She is alert and oriented to person, place, and time. She displays abnormal reflex (Brisk lower extremity reflexes).    Skin: Skin is warm and dry. She is not diaphoretic.

## 2023-02-17 NOTE — PLAN OF CARE
Patient afebrile and had no falls this shift. Fundus firm without massage and below umbilicus. Bleeding light to moderate, no clots passed this shift. Antony secure and draining clear, yellow urine. Patient currently on bedrest due to mag infusion. Mag infusion to be discontinued at 1834. Denies pain at this time. Vital signs stable at this time. Bonding well with infant; responds to infant cues and participates in infant care. Will continue to monitor.

## 2023-02-17 NOTE — LACTATION NOTE
This note was copied from a baby's chart.  Discussed practices that support optimal maternity care and  feeding such as immediate skin to skin, the magic first hour, the importance of the first feeding, and delaying routine procedures. Also discussed continued skin to skin contact, rooming-in, and feeding on cue. Discussed feeding choice with mother. Reviewed benefits of breastfeeding and risks of formula feeding. Mother states her intention is to breastfeed.    Discussed early feeding cues and encouraged mother to feed baby in response to those cues. Encouraged unrestricted feedings rather than timed/amount limits, procedural schedules, or visitation schedules. Reviewed normal feeding expectations of 8 or more feedings per 24 hour period, cues that babies use to signal hunger and satiety, and the importance of physical contact during feeding.      Infant's suck too disorganized to latch effectively.  Finger fed 1ml of EBM.

## 2023-02-17 NOTE — LACTATION NOTE
Lactation Rounds:    Lactation packet reviewed for days 1-2.  Discussed early feeding cues and encouraged mother to feed baby in response to those cues. Encouraged on demand feedings and skin to skin.  Reviewed normal feeding expectations of 8 or more feedings per 24 hour period, cues that babies use to signal hunger and satiety and cluster feeding. Discussed the adequacy of colostrum and baby belly size for the first 3 days of life along with expected output.     Mother needs reassurance that infant is feeding adequately. She has breast fed her two previous babies and remembers feeling a milk supply. Discussed with mother what to expect the first few days, when she would feel like she has milk and what to expect from infant.     Mother states understanding and verbalized appropriate recall. Encouraged mother to call for assistance when desired or when infant is showing signs of hunger, contact number provided, mother verbalizes understanding.

## 2023-02-17 NOTE — LACTATION NOTE
Lactation called to room for assistance. Mother requested assistance with latching. Infant is showing feeding. Full assistance provided with latching as mother is sleepy. Infant is eager to latch then falls asleep. Waking techniques utilized. Infant opens wide on the left breast and latch was obtained after several attempts. Audible swallows noted, and mother denies pain and discomfort. Infant ate until content, nipple color and shape wnl. Further skin to skin encouraged. FOB at the bedside.     Mother denies any further lactation needs or concerns at this time. Encouraged mother to call for assistance when desired or when infant is showing signs of hunger. Mother verbalizes understanding of all education and counseling.

## 2023-02-17 NOTE — PROGRESS NOTES
Notified by RN that pt feels bad.    On arrival to the bedside, pt is shaking.  States she has a headache.  BP severely elevated.  3+ reflexes bilaterally with significant hyperreflexia.  Urine P/C ratio 0.48.  Pt with severe preeclampsia.  Start IV magnesium sulfate for seizure prophylaxis.  IV antihypertensives for BP control.

## 2023-02-17 NOTE — LACTATION NOTE
Lactation Rounds:     Mother in LDD room, she need assistance with breastfeeding, Infant is swaddled and sleeping in family member arms at the bedside. Mother states that infant was rooting and she wanted to latch him. Skin to skin encouraged and done. Infant started showing feeding cues. Attempted to latch infant on the left breast in football hold. Infant opens mouth and hold breast. Waking techniques used, infant sleepy at the breast.     Infant placed on mother's chest for skin to skin. Hand expression discussed and encouraged. Mother is sleepy, fully assisted and collected 0.5 mls EBM, and syringe fed to infant. Infant placed back to chest for skin to skin. Family at the bedside, feeding crackers to mother.     Mother verbalizes understanding of expected  behaviors and output for the first 48 hours of life. Discussed the importance of cue based feedings on demand, unrestricted access to the breast, and frequent uninterrupted skin to skin contact. Risk and implications of artificial nipples and non medically indicated formula supplementation discussed.      Mother denies any further lactation needs or concerns at this time. Encouraged mother to call for assistance when desired or when infant is showing signs of hunger. Mother verbalizes understanding of all education and counseling.

## 2023-02-17 NOTE — ASSESSMENT & PLAN NOTE
- Continue Magnesium infusion  - Received one dose of Labetalol yesterday, remains available PRN  - BP improved this morning  - Patient reports feeling better  - Continue to monitor closely

## 2023-02-17 NOTE — TRANSFER OF CARE
Anesthesia Transfer of Care Note    Patient: Yee Adler    Procedure(s) Performed: Procedure(s) (LRB):   SECTION (N/A)    Patient location: Labor and Delivery    Anesthesia Type: spinal    Transport from OR: Transported from OR on room air with adequate spontaneous ventilation    Post pain: adequate analgesia    Post assessment: no apparent anesthetic complications    Post vital signs: stable    Level of consciousness: awake, alert and oriented    Nausea/Vomiting: no nausea/vomiting    Complications: none    Transfer of care protocol was followed      Last vitals:   Visit Vitals  /86   Pulse 70   Temp 36.4 °C (97.5 °F) (Oral)   Resp 20   LMP 2022 (Exact Date)   SpO2 99%   Breastfeeding Unknown

## 2023-02-18 PROCEDURE — 99024 POSTOP FOLLOW-UP VISIT: CPT | Mod: ,,, | Performed by: OBSTETRICS & GYNECOLOGY

## 2023-02-18 PROCEDURE — 25000003 PHARM REV CODE 250: Performed by: OBSTETRICS & GYNECOLOGY

## 2023-02-18 PROCEDURE — 11000001 HC ACUTE MED/SURG PRIVATE ROOM

## 2023-02-18 PROCEDURE — 99024 PR POST-OP FOLLOW-UP VISIT: ICD-10-PCS | Mod: ,,, | Performed by: OBSTETRICS & GYNECOLOGY

## 2023-02-18 RX ADMIN — IBUPROFEN 800 MG: 800 TABLET, FILM COATED ORAL at 04:02

## 2023-02-18 RX ADMIN — FERROUS SULFATE TAB 325 MG (65 MG ELEMENTAL FE) 1 EACH: 325 (65 FE) TAB at 08:02

## 2023-02-18 RX ADMIN — PRENATAL VITAMINS-IRON FUMARATE 27 MG IRON-FOLIC ACID 0.8 MG TABLET 1 TABLET: at 08:02

## 2023-02-18 RX ADMIN — IBUPROFEN 800 MG: 800 TABLET, FILM COATED ORAL at 11:02

## 2023-02-18 RX ADMIN — IBUPROFEN 800 MG: 800 TABLET, FILM COATED ORAL at 12:02

## 2023-02-18 RX ADMIN — IBUPROFEN 800 MG: 800 TABLET, FILM COATED ORAL at 08:02

## 2023-02-18 RX ADMIN — DOCUSATE SODIUM 100 MG: 100 CAPSULE, LIQUID FILLED ORAL at 08:02

## 2023-02-18 NOTE — PROGRESS NOTES
Mag infusion D/C'd at 1835. Jorgensen left in place. Informed patient to call when ready to get up and informed her that jorgensen will come out at that time. Patient voiced understanding. Report given to AGGIE Arizmendi RN.

## 2023-02-18 NOTE — PROGRESS NOTES
PostOp Progress Note  Obstetrics/Gynecology      SUBJECTIVE:     PostOperative Day 2:   Status Post: Repeat     The patient is status post a repeat  section on 1620.  Procedure was without complications but following surgery the patient was found to be severely preeclamptic and received 24 hours of IV magnesium.    On evaluation today the patient is currently without complaints.  No headaches.  Tolerating regular diet.    Has ambulated in the halls.    No significant pelvic pain or vaginal bleeding.      OBJECTIVE:     Vital Signs (Most Recent):  Temp: 98.4 °F (36.9 °C) (23)  Pulse: 100 (23)  Resp: 18 (23)  BP: 133/77 (23)  SpO2: 100 % (23)    Vital Signs Range (Last 24H):  Temp:  [98.3 °F (36.8 °C)-98.9 °F (37.2 °C)]   Pulse:  []   Resp:  [18]   BP: (122-140)/(72-94)   SpO2:  [99 %-100 %]     I & O (Last 24H):  Intake/Output Summary (Last 24 hours) at 2023 1039  Last data filed at 2023 0300  Gross per 24 hour   Intake 1127.67 ml   Output 2800 ml   Net -1672.33 ml         Physical Exam:    Most recent BP: 133/77    General:    no acute distress   Lungs:  No respiratory distress   Heart:  regular rate, No tachycardia        Abdomen:  soft, non-tender; bowel sounds normal       Incision:  healing well.  Bandage dry         Extremity:  no calf tenderness     Lower extremity DTR 2+    Hemoglobin/Hematocrit  Recent Labs   Lab 23   HGB 9.0*   HCT 27.6*       Lab Results   Component Value Date    WBC 14.34 (H) 2023    RBC 3.88 (L) 2023    HGB 9.0 (L) 2023    HCT 27.6 (L) 2023    MCV 71 (L) 2023    MCH 23.2 (L) 2023    MCHC 32.6 2023    RDW 15.3 (H) 2023     2023    MPV 11.0 2023    GRAN 10.7 (H) 2023    GRAN 74.4 (H) 2023    LYMPH 2.4 2023    LYMPH 16.7 (L) 2023    MONO 1.1 (H) 2023    MONO 7.7 2023    EOS 0.0  2023    BASO 0.05 2023    EOSINOPHIL 0.3 2023    BASOPHIL 0.3 2023        ABO/Rh  Lab Results   Component Value Date    GROUPTRH A POS 2023         ASSESSMENT      Status post:  Repeat  section   Preeclampsia with severe features: Now status post 24 hours IV magnesium     PLAN:  The patient is currently doing well from a postpartum standpoint.  No signs of worsening preeclampsia.    Continued to ambulate.    Routine postoperative care with expectant management.        The above was discussed with patient.  Her questions were answered.

## 2023-02-18 NOTE — PLAN OF CARE
Patient progressing well. VSS. Urinating without difficulty. Fundus firm and vaginal bleeding light. Pain controlled with PO meds.

## 2023-02-19 VITALS
OXYGEN SATURATION: 99 % | HEART RATE: 100 BPM | TEMPERATURE: 98 F | RESPIRATION RATE: 18 BRPM | SYSTOLIC BLOOD PRESSURE: 149 MMHG | DIASTOLIC BLOOD PRESSURE: 78 MMHG

## 2023-02-19 PROCEDURE — 25000003 PHARM REV CODE 250: Performed by: OBSTETRICS & GYNECOLOGY

## 2023-02-19 PROCEDURE — 99232 PR SUBSEQUENT HOSPITAL CARE,LEVL II: ICD-10-PCS | Mod: GT,,, | Performed by: OBSTETRICS & GYNECOLOGY

## 2023-02-19 PROCEDURE — 99232 SBSQ HOSP IP/OBS MODERATE 35: CPT | Mod: GT,,, | Performed by: OBSTETRICS & GYNECOLOGY

## 2023-02-19 RX ORDER — IBUPROFEN 600 MG/1
600 TABLET ORAL 3 TIMES DAILY
Qty: 30 TABLET | Refills: 0 | Status: SHIPPED | OUTPATIENT
Start: 2023-02-19

## 2023-02-19 RX ORDER — OXYCODONE AND ACETAMINOPHEN 5; 325 MG/1; MG/1
1 TABLET ORAL EVERY 4 HOURS PRN
Qty: 18 TABLET | Refills: 0 | Status: SHIPPED | OUTPATIENT
Start: 2023-02-19

## 2023-02-19 RX ADMIN — DOCUSATE SODIUM 100 MG: 100 CAPSULE, LIQUID FILLED ORAL at 08:02

## 2023-02-19 RX ADMIN — PRENATAL VITAMINS-IRON FUMARATE 27 MG IRON-FOLIC ACID 0.8 MG TABLET 1 TABLET: at 08:02

## 2023-02-19 RX ADMIN — IBUPROFEN 800 MG: 800 TABLET, FILM COATED ORAL at 08:02

## 2023-02-19 RX ADMIN — FERROUS SULFATE TAB 325 MG (65 MG ELEMENTAL FE) 1 EACH: 325 (65 FE) TAB at 08:02

## 2023-02-19 NOTE — PLAN OF CARE
Patient afebrile and had no falls this shift. Fundus firm without massage and below umbilicus. Bleeding light, no clots passed this shift. No drainage on aquacel dressing. Voids spontaneously. Ambulates independently. Pain well controlled with oral pain medication. Vital signs stable at this time. Bonding well with infant; responds to infant cues and participates in infant care. Call light placed within patient reach.

## 2023-02-19 NOTE — DISCHARGE INSTRUCTIONS
"Mother Self Care:    Activity: Avoid strenuous exercise and get adequate rest.  No driving until the physician consent given.  Emotional Changes: Most women find birth to be a time of great emotional upheaval.  Sense of loss, mood swings, fatigue, anxiety, and feeling "let down" are common.  If feelings worsen or last more than a week, call your physician.  Breast Care/Breastfeeding: Wear a bra for comfort.  Keep nipples dry and apply your own breast milk or lanolin cream as needed for soreness.  Engorgement can be relieved with warm, moist heat before feedings.  You may also take Ibuprofen.  Breast Care/Bottle Feeding: Wear support bra 24 hours a day for one week.  Avoid stimulation to breasts.  You may use ice packs for discomfort.  Homero-Care/Vaginal Bleeding: Remember to use your homero-bottle after urinating.  Your flow will change from red, to pink, to yellow/white color over a period of 2 weeks.  Menstruation will return in 3-8 weeks, or longer if breastfeeding.  Episiotomy Vaginal Delivery: Stitches will dissolve within 10 days to 3 weeks.  Warm baths, tucks, and dermoplast will promote healing.  Avoid bubble baths or strong soaps.   Section/Tubal Ligation: Keep incision clean and dry. You may shower, but avoid baths.  Sexual Activity/Pelvic Rest: No sexual activity, tampons, or douching until your physician gives you consent.  Diet: Continue to eat from the five basic food groups, including plenty of protein, fruits, vegetables, and whole grains.  Limit empty calories and high fat foods.  Drink enough fluids to satisfy thirst and add an extra 500 calories for breastfeeding.  Constipation/Hemorrhoids: Drink plenty of water.  You may take a stool softener or natural laxative (Metamucil). You may use tucks or hemorrhoid ointment and soak in a warm tub.    CALL YOUR OB DOCTOR IF ANY OF THE FOLLOWING OCCURS:  *Heavy bleeding - saturating a pad an hour or passing any large (2-3 inches in size) blood " clots.  *Any pain, redness, or tenderness in lower leg.  *You cannot care for yourself or your baby.  *Any signs of infection-      - Temperature greater than 100.5 degrees F      - Foul smelling vaginal discharge and/or incisional drainage      - Increased episiotomy or incisional pain      - Hot, hard, red or sore area on breast      - Flu-like symptoms      - Any urgency, frequency or burning with urination    Return To the Hospital for further Evaluation:  Headache not relieved by tylenol or ibuprofen  Blurry vision, double vision, seeing spots, or flashing lights  Feeling faint or passing out  Right epigastric pain  Difficulty breathing  Swelling in hands, face, or feet  Any of these symptoms accompanied by nausea/vomiting  Gaining more than 5 pounds in one week  Seizures  These symptoms could be an indication of elevated blood pressure.       If you have any questions that need to be answered immediately please call the Labor & Delivery Unit at [phone] and ask to speak to a nurse.

## 2023-02-19 NOTE — LACTATION NOTE
Baby is showing feeding cues. Helped mother to settle in a cross cradle position on the left breast. Reviewed deep asymmetric latch and proper positioning. Mother is able to demonstrate back and deep latch easily obtained. Audible swallows noted, and mother denies pain or discomfort. Baby fed until content, and nipple shape and color is WDL upon unlatching. Reviewed hand expression and nipple care; mother able to return back demonstration.      Mother anticipates discharge home today. Reviewed signs of good attachment. Reviewed breast massage and compression during feedings and indications for use. Reviewed signs of effective milk transfer and instructed to call pediatrician and lactation if signs not present. Discussed expected feeding and output pattern for days of life 2, 3, 4, & 5+; mother instructed to call pediatrician and lactation if infant is not meeting feeding and output goals.     Reviewed signs of engorgement and expectant management. Reviewed signs of mastitis and instructed mother to call OB provider and lactation if any signs present. Discussed proper use of First Alert Form. Reviewed proper milk handling, collection and storage guidelines. Reviewed nursing diet and nutrition. Discussed resources for medication safety while breastfeeding. Reviewed available outpatient lactation resources.     Mother verbalizes understanding of all education and counseling; she denies any further lactation needs or concerns at this time. Encouraged mother to contact lactation with any questions, concerns, or problems, contact number provided.

## 2023-02-19 NOTE — DISCHARGE SUMMARY
Discharge Summary:  2023    Hospital: [facility]    Admit Date: 2023    Discharge Date: 2023    Delivery Physician: Verito Becker M.D.    Reason for Admission:  Oligohydramnios and history of previous  section    Procedures Performed:   Repeat  section    Hospital Course:      The patient's  section was without complications but on the evening following  section the patient was found to be preeclamptic and IV magnesium therapy was initiated.  The patient received 24 hours of IV magnesium.    Blood pressures have remained stable throughout the remainder of the patient's hospitalization.    The patient currently reports no postoperative or postpartum issues.  Good pain control on oral medications.    Ambulating without difficulty.   Tolerating regular diet.    No headaches or visual changes    Vital signs reviewed.  BP's: 137/81 & 139/79    Labs:   Recent Results (from the past 336 hour(s))   CBC auto differential    Collection Time: 23  6:12 PM   Result Value Ref Range    WBC 14.34 (H) 3.90 - 12.70 K/uL    Hemoglobin 9.0 (L) 12.0 - 16.0 g/dL    Hematocrit 27.6 (L) 37.0 - 48.5 %    Platelets 191 150 - 450 K/uL   CBC auto differential    Collection Time: 23 10:33 AM   Result Value Ref Range    WBC 7.05 3.90 - 12.70 K/uL    Hemoglobin 9.7 (L) 12.0 - 16.0 g/dL    Hematocrit 29.9 (L) 37.0 - 48.5 %    Platelets 193 150 - 450 K/uL        Heart: Regular rate and rhythm  Lungs:  No abnormal pulmonary effort.  Clear to auscultation.  No pulmonary or cardiac issues noted.  DTR 2+ lower ext  Abdomen is soft with appropriate tenderness.  No significant issues with surgical sites.  Bandage clean and dry.    Discharge Diet:  Regular    Discharge Activity: Pelvic rest x 6 weeks, light activity x 2 weeks.     Discharge Medications:      Medication List        START taking these medications      ibuprofen 600 MG tablet  Commonly known as: ADVIL,MOTRIN  Take 1  tablet (600 mg total) by mouth 3 (three) times daily.     oxyCODONE-acetaminophen 5-325 mg per tablet  Commonly known as: PERCOCET  Take 1 tablet by mouth every 4 (four) hours as needed for Pain.            CONTINUE taking these medications      ferrous sulfate 325 (65 FE) MG EC tablet  Take 1 tablet (325 mg total) by mouth 2 (two) times daily.     PRENATAL-1 ORAL               Where to Get Your Medications        These medications were sent to [place] [id number] [facility]  [place]   [place] [place] [address]      Phone: [phone]   ibuprofen 600 MG tablet  oxyCODONE-acetaminophen 5-325 mg per tablet          Discharge Follow-Up:  1 Week or as needed for bandage removal    Condition on Discharge:  stable    Discharge Diagnosis:   S/P:  Repeat  section  2.  Postpartum Preeclampsia    Precautions and instructions discussed prior to discharge, call with any postoperative/gynecologic issues if necessary prior to postoperative follow-up.    Signs and symptoms of preeclampsia reviewed.      The above was reviewed and discussed with the patient.    The patient's questions regarding discharge were answered and she is in agreement with the discharge plan.    Salvador See M.D., FACOG

## 2023-02-19 NOTE — LACTATION NOTE
Lactation Rounds:   Mother states that infant has been extra fussy this evening and would not latch. Infant has been latching well until this evening. Mother is sitting on the rocking chair, she offered her left breast to infant. Infant latched well instantly. Nutritive sucks with audible swallows noted. Mother denies pain and discomfort. Infant ate well and came off the breast. Nipple color and shape wnl. Nipple care discussed.     Infant is 55 hrs old. Discussed that infant may not be ready to breastfeed earlier when she attempted to feed him. Skin to skin encouraged when infant is not ready to latch. Discussed that infant is able to find the breasts when ready to eat. Discussed to continue to feed on demand.     Breasts fullness noted. Discussed rational and benefits of softening areola first before offering breasts to infant. Discussed importance of removing milk to avoid breasts engorgement, signs of engorgement reviewed. Reviewed effective signs of good attachment and milk transfer. Breast massages and compression encouraged.     Mother states that infant will have circumcision tomorrow. Discussed breastfeeding expectations after the procedure. Encouraged mother to hand express milk and give infant as needed if too sleepy to breastfeed. Mother verbalized understanding.     Mother denies any further lactation needs or concerns at this time. Encouraged mother to call for assistance when desired or when infant is showing signs of hunger. Mother verbalizes understanding of all education and counseling.

## 2023-02-19 NOTE — LACTATION NOTE
Lactation called to room for assistance.   Mother states that infant has been crying and have difficulty latching. Infant is showing feeding cues. Mother offered her right breast, infant slightly touches breast with his lips then pulled away. Breasts fullness noted. Assisted mother to soften areola before latching. Infant latched instantly, nutritive sucks with audible swallows noted. Infant ate until content and came off the breast and fell asleep. Nipple shape and color wnl.     Discussed rational for areola softening before latching infant so infant will be able to compress breasts without difficulty, and maintains latch.     Mother denies any further lactation needs or concerns at this time. Encouraged mother to call for assistance when desired. Mother verbalizes understanding of all education and counseling.

## 2023-02-23 ENCOUNTER — POSTPARTUM VISIT (OUTPATIENT)
Dept: OBSTETRICS AND GYNECOLOGY | Facility: CLINIC | Age: 33
End: 2023-02-23
Payer: COMMERCIAL

## 2023-02-23 ENCOUNTER — PATIENT MESSAGE (OUTPATIENT)
Dept: OBSTETRICS AND GYNECOLOGY | Facility: CLINIC | Age: 33
End: 2023-02-23

## 2023-02-23 VITALS
SYSTOLIC BLOOD PRESSURE: 148 MMHG | WEIGHT: 210.75 LBS | HEIGHT: 68 IN | BODY MASS INDEX: 31.94 KG/M2 | DIASTOLIC BLOOD PRESSURE: 86 MMHG

## 2023-02-23 DIAGNOSIS — Z98.891 STATUS POST REPEAT LOW TRANSVERSE CESAREAN SECTION: Primary | ICD-10-CM

## 2023-02-23 DIAGNOSIS — R03.0 ELEVATED BP WITHOUT DIAGNOSIS OF HYPERTENSION: ICD-10-CM

## 2023-02-23 PROCEDURE — 99999 PR PBB SHADOW E&M-EST. PATIENT-LVL III: ICD-10-PCS | Mod: PBBFAC,,, | Performed by: PHYSICIAN ASSISTANT

## 2023-02-23 PROCEDURE — 99999 PR PBB SHADOW E&M-EST. PATIENT-LVL III: CPT | Mod: PBBFAC,,, | Performed by: PHYSICIAN ASSISTANT

## 2023-02-23 PROCEDURE — 0503F POSTPARTUM CARE VISIT: CPT | Mod: CPTII,S$GLB,, | Performed by: PHYSICIAN ASSISTANT

## 2023-02-23 PROCEDURE — 0503F PR POSTPARTUM CARE VISIT: ICD-10-PCS | Mod: CPTII,S$GLB,, | Performed by: PHYSICIAN ASSISTANT

## 2023-02-23 RX ORDER — FUROSEMIDE 20 MG/1
20 TABLET ORAL DAILY
Qty: 3 TABLET | Refills: 0 | Status: SHIPPED | OUTPATIENT
Start: 2023-02-23 | End: 2023-03-02

## 2023-02-23 RX ORDER — LABETALOL 100 MG/1
100 TABLET, FILM COATED ORAL 2 TIMES DAILY
Qty: 60 TABLET | Refills: 11 | Status: SHIPPED | OUTPATIENT
Start: 2023-02-23 | End: 2024-02-23

## 2023-02-23 NOTE — PROGRESS NOTES
OBGYN Post-op Clinic  History and Physical    Patient Name: Yee Adler  YOB: 1990 (32 y.o.)  MRN: [id number]  Today's Date: 2023    Referring Md:   Aaareferral Self  No address on file    SUBJECTIVE:     Chief Complaint: Post-op  Dressing Removal    History of Present Illness:  Yee Adler is a 32 y.o. female  who presents to the clinic today for acquacel dressing removal and BP check. S/p  on . Not currently taking any BP meds. Pressure running high in clinic today at 148/86, still elevated upon recheck. Reports some headaches but no other pre-e alarm symptoms. Is experiencing BLE edema, with swelling worse in the right leg. No calf tenderness, redness, or SOB. Reports that this has been an issue throughout pregnancy and now persists after delivery. Does report some constipation, but has not been taking her stool softener. Advised her to start taking the stool softener daily to prevent straining.       Review of patient's allergies indicates:   Allergen Reactions    Eggs-apples-oats [nutritional supplement-fiber] Other (See Comments)     Lip reaction       Past Medical History:   Diagnosis Date    Abdominal pain during pregnancy in third trimester 2023    Anemia 2014    Oligohydramnios in third trimester 2023    Severe pre-eclampsia in third trimester 2023     Past Surgical History:   Procedure Laterality Date     SECTION       SECTION N/A 2023    Procedure:  SECTION;  Surgeon: Verito Becker MD;  [facility];  Service: OB/GYN;  Laterality: N/A;    WISDOM TOOTH EXTRACTION       Family History   Problem Relation Age of Onset    Stroke Maternal Grandmother     Diabetes Mother     Hypertension Mother     Breast cancer Neg Hx     Cancer Neg Hx     Colon cancer Neg Hx     Eclampsia Neg Hx     Miscarriages / Stillbirths Neg Hx     Ovarian cancer Neg Hx      labor Neg Hx      Social History  "    Tobacco Use    Smoking status: Never     Passive exposure: Never    Smokeless tobacco: Never   Substance Use Topics    Alcohol use: No    Drug use: No        Review of Systems:  Review of Systems   Constitutional:  Negative for chills and fever.   HENT:  Negative for congestion and sore throat.    Respiratory:  Negative for cough and shortness of breath.    Cardiovascular:  Positive for leg swelling. Negative for chest pain.   Gastrointestinal:  Positive for constipation. Negative for diarrhea, nausea and vomiting.   Genitourinary:  Negative for dysuria.   Musculoskeletal:  Negative for myalgias.   Skin:  Negative for rash.   Neurological:  Positive for headaches. Negative for weakness.     OBJECTIVE:     Vital Signs (Most Recent)  BP (!) 148/86   Ht 5' 8" (1.727 m)   Wt 95.6 kg (210 lb 12.2 oz)   LMP 05/14/2022 (Exact Date)   Breastfeeding Yes   BMI 32.05 kg/m²     Physical Exam  Vitals and nursing note reviewed.   Constitutional:       General: She is not in acute distress.     Appearance: Normal appearance.   HENT:      Head: Normocephalic and atraumatic.   Eyes:      General: No scleral icterus.        Right eye: No discharge.         Left eye: No discharge.      Conjunctiva/sclera: Conjunctivae normal.   Pulmonary:      Effort: Pulmonary effort is normal. No respiratory distress.   Abdominal:      Palpations: Abdomen is soft.      Tenderness: There is abdominal tenderness (Appropriately TTP).      Comments: Aquacel dressing over Pfannenstiel incision removed in clinic. Incision intact with no erythema, warmth, or drainage.    Musculoskeletal:         General: Normal range of motion.      Cervical back: Normal range of motion.      Right lower leg: Edema present.      Left lower leg: Edema present.   Skin:     General: Skin is warm and dry.   Neurological:      General: No focal deficit present.      Mental Status: She is alert and oriented to person, place, and time.      Deep Tendon Reflexes: Reflexes " normal.   Psychiatric:         Mood and Affect: Mood normal.         Behavior: Behavior normal.         Thought Content: Thought content normal.         Judgment: Judgment normal.           ASSESSMENT/PLAN:     Yee Adler is a 32 y.o. female was seen today for dressing change and blood pressure check. BP running a bit high in clinic. Will start patient on low dose Labetalol as patient was concerned about taking too much medication. Patient will monitor BP at home as well. Reviewed pre-e signs and symptoms. Advised patient to present to hospital with BP >160/100 or if BP >140/90 with pre-e symptoms. Will also do short course of lasix for leg swelling.   I insured patient has a scheduled postpartum visit within one month. Once again, I reviewed all restrictions & limitations with the patient and instructed her to call clinic with any concerning issues or symptoms. Instructed the importance of showering daily, no tub baths, using an antibacterial soap. Patient verbalized understanding.     Yee was seen today for dressing removal.    Diagnoses and all orders for this visit:    Status post repeat low transverse  section  -     furosemide (LASIX) 20 MG tablet; Take 1 tablet (20 mg total) by mouth once daily. for 3 days  - Showers only, pelvic rest, and no heavy lifting/strenuous activity for 6 weeks  - Ibuprofen PRN for pain  - Stool softener daily  -  RTC next week for BP re-check  - Keep f/u appointment with surgeon in a few weeks      Elevated BP without diagnosis of hypertension  -     labetaloL (NORMODYNE) 100 MG tablet; Take 1 tablet (100 mg total) by mouth 2 (two) times daily.  - Present to hospital if pre-e symptoms arise and/or BP >160/100  - RTC in one week for BP re-check        Yuki العلي, [facility], PA-C  OBGYN - Surgery  [facility]

## 2023-03-02 ENCOUNTER — POSTPARTUM VISIT (OUTPATIENT)
Dept: OBSTETRICS AND GYNECOLOGY | Facility: CLINIC | Age: 33
End: 2023-03-02
Payer: COMMERCIAL

## 2023-03-02 VITALS
SYSTOLIC BLOOD PRESSURE: 142 MMHG | BODY MASS INDEX: 31.48 KG/M2 | WEIGHT: 207.69 LBS | HEIGHT: 68 IN | DIASTOLIC BLOOD PRESSURE: 82 MMHG

## 2023-03-02 DIAGNOSIS — O14.13 SEVERE PRE-ECLAMPSIA IN THIRD TRIMESTER: ICD-10-CM

## 2023-03-02 DIAGNOSIS — Z98.891 STATUS POST REPEAT LOW TRANSVERSE CESAREAN SECTION: Primary | ICD-10-CM

## 2023-03-02 PROCEDURE — 99024 PR POST-OP FOLLOW-UP VISIT: ICD-10-PCS | Mod: S$GLB,,, | Performed by: OBSTETRICS & GYNECOLOGY

## 2023-03-02 PROCEDURE — 99024 POSTOP FOLLOW-UP VISIT: CPT | Mod: S$GLB,,, | Performed by: OBSTETRICS & GYNECOLOGY

## 2023-03-02 PROCEDURE — 99999 PR PBB SHADOW E&M-EST. PATIENT-LVL III: CPT | Mod: PBBFAC,,,

## 2023-03-02 PROCEDURE — 99999 PR PBB SHADOW E&M-EST. PATIENT-LVL III: ICD-10-PCS | Mod: PBBFAC,,,

## 2023-03-02 RX ORDER — FUROSEMIDE 40 MG/1
40 TABLET ORAL DAILY PRN
Qty: 7 TABLET | Refills: 0 | Status: SHIPPED | OUTPATIENT
Start: 2023-03-02 | End: 2023-04-03

## 2023-03-02 NOTE — PROGRESS NOTES
Subjective:       Patient ID: Yee Adler is a 32 y.o. female.    Chief Complaint:  Blood Pressure Check      History of Present Illness  HPI  Postpartum Follow-up  Patient presents to the clinic 2 weeks status post  for  BP check . Denies HA, changes to her vision, RUQ pain.  Leg swelling persists, didn't find the lasix to be effective.  GYN & OB History  Patient's last menstrual period was 2022 (exact date).   Date of Last Pap: 2022    OB History    Para Term  AB Living   3 3 3     3   SAB IAB Ectopic Multiple Live Births         0 3      # Outcome Date GA Lbr Jose G/2nd Weight Sex Delivery Anes PTL Lv   3 Term 23 39w3d  3.2 kg (7 lb 0.9 oz) M , V Spinal N NILO      Complications: Oligohydramnios   2 Term 16 41w3d  3.13 kg (6 lb 14.4 oz) M Vag-Spont None N NILO   1 Term 10/06/14 41w0d  3.419 kg (7 lb 8.6 oz) M CS-LTranv Spinal N NILO       Review of Systems  Review of Systems   Constitutional:  Negative for activity change, chills, fatigue and fever.   Respiratory:  Negative for cough.    Cardiovascular:  Positive for leg swelling. Negative for chest pain.   Gastrointestinal:  Negative for abdominal pain, constipation, nausea and vomiting.   Genitourinary:  Negative for dysuria, frequency, hematuria, pelvic pain, urgency, vaginal bleeding and vaginal discharge.   Integumentary:  Negative for rash.         Objective:    Physical Exam:   Constitutional: She is oriented to person, place, and time. She appears well-developed and well-nourished. No distress.       Cardiovascular:  Normal rate.             Pulmonary/Chest: Effort normal. No respiratory distress.        Abdominal: Soft. She exhibits abdominal incision (incision intact and healing well). She exhibits no distension. There is no abdominal tenderness. There is no guarding.             Musculoskeletal: Moves all extremeties. Edema (2+ BLe) present.      Comments: No calf tenderness       Neurological:  She is alert and oriented to person, place, and time.    Skin: Skin is warm and dry. No rash noted. She is not diaphoretic.         Problem List Items Addressed This Visit          Obstetric    Status post repeat low transverse  section - Primary    Relevant Medications    furosemide (LASIX) 40 MG tablet    Severe pre-eclampsia in third trimester   Reviewed home BP measurements, all well controlled. Continue labetalol  Lasix prn  Patient doing well post op  Patient is scheduled for postpartum visit with .    Reviewed all restrictions & limitations with the patient. Advised to call clinic in event of fever, redness or drainage around the incision, worsening pain, or any any concerning issues or symptoms.  Instructed the importance of showering daily, no tub baths, using an antibacterial soap.  Patient verbalized understanding.

## 2023-03-11 ENCOUNTER — NURSE TRIAGE (OUTPATIENT)
Dept: ADMINISTRATIVE | Facility: CLINIC | Age: 33
End: 2023-03-11
Payer: COMMERCIAL

## 2023-03-11 NOTE — TELEPHONE ENCOUNTER
Pt states that she is 3 weeks post partum and is still spotting; changing her liner about every 3 hours. Pt c/o passing a blood clot the size of her thumb once today. Home care advised. Encounter routed to provider.       Reason for Disposition   Normal postpartum bleeding    Additional Information   Negative: Shock suspected (e.g., cold/pale/clammy skin, too weak to stand, low BP, rapid pulse)   Negative: Difficult to awaken or acting confused (e.g., disoriented, slurred speech)   Negative: Passed out (i.e., lost consciousness, collapsed and was not responding)   Negative: Sounds like a life-threatening emergency to the triager   Negative: SEVERE dizziness (e.g., unable to stand, requires support to walk, feels like passing out now)   Negative: [1] SEVERE abdominal pain AND [2] present > 1 hour   Negative: Fever > 100.4 F (38.0 C)   Negative: SEVERE vaginal bleeding (e.g., soaking 2 pads or tampons per hour and present 2 or more hours; 1 menstrual cup every 2 hours)   Negative: Patient sounds very sick or weak to the triager   Negative: MODERATE vaginal bleeding (e.g., soaking 1 pad or tampon per hour and present > 6 hours; 1 menstrual cup every 6 hours)   Negative: [1] Constant abdominal pain AND [2] present > 2 hours   Negative: Pale skin (pallor) of new-onset or worsening   Negative: Foul smelling vaginal discharge (i.e., lochia)   Negative: Passed tissue (e.g., gray-white)   Negative: Bleeding with > 6 soaked pads per day   Negative: [1] Passing blood clots AND [2] persists > 4 days postpartum   Negative: Taking Coumadin (warfarin) or other strong blood thinner, or known bleeding disorder (e.g., thrombocytopenia)   Negative: [1] Skin bruises or nosebleed AND [2] not caused by an injury   Negative: [1] Vaginal bleeding or spotting lasts > 4 weeks AND [2] red or red-brown   Negative: [1] Vaginal bleeding or spotting lasts > 5 weeks AND [2] pale-brown or pink    Protocols used: Postpartum - Vaginal Bleeding and  Oskar

## 2023-03-30 ENCOUNTER — PATIENT MESSAGE (OUTPATIENT)
Dept: OBSTETRICS AND GYNECOLOGY | Facility: HOSPITAL | Age: 33
End: 2023-03-30
Payer: COMMERCIAL

## 2023-03-30 ENCOUNTER — TELEPHONE (OUTPATIENT)
Dept: LACTATION | Facility: CLINIC | Age: 33
End: 2023-03-30
Payer: COMMERCIAL

## 2023-03-30 NOTE — TELEPHONE ENCOUNTER
Mother (Yee) called lactation warm line:    Mother states that she gave birth to Huy at [facility] on 2/16/23. She reports that she  for a week and then had to be put on blood pressure medication and a diuretic. She states that the diuretic decreased her supply and she did not feel comfortable breastfeeding him while on the medications. As a result, mother began formula feeding and was pumping here and there to maintain supply. Mother is now off of medications and would like an OPC for latching help, increase her milk supply, and wean from formula.    Mother has a Medline pump that she is using 4-5 times a day for 15 minutes. She is collecting about 1 oz EBM total with each pump session. She is also nursing baby here and there, but reports that positioning and breastfeeding feels awkward and uncomfortable. Discussed positioning briefly; will send [facility] Media: Attaching Your Baby at the Breast via [employer] for reference. Mother verbalizes understanding.    Instructions:  Offer breast (if you're up to it) based on feeding cues  Supplement with breast milk first; formula after if still showing feeding cues.  Hands on pump after (at least 8-10x/day for 15-30 minutes)  Hand express after pumping    Informed mother to expect a call from  this week for OPC. Mother denies any further lactation needs or concerns at this time. Encouraged mother to call for assistance when desired. Mother verbalizes understanding of all education and counseling.

## 2023-04-03 ENCOUNTER — POSTPARTUM VISIT (OUTPATIENT)
Dept: OBSTETRICS AND GYNECOLOGY | Facility: CLINIC | Age: 33
End: 2023-04-03
Payer: COMMERCIAL

## 2023-04-03 VITALS
HEIGHT: 68 IN | SYSTOLIC BLOOD PRESSURE: 110 MMHG | BODY MASS INDEX: 31.94 KG/M2 | WEIGHT: 210.75 LBS | DIASTOLIC BLOOD PRESSURE: 60 MMHG

## 2023-04-03 DIAGNOSIS — Z98.891 STATUS POST REPEAT LOW TRANSVERSE CESAREAN SECTION: Primary | ICD-10-CM

## 2023-04-03 DIAGNOSIS — O14.13 SEVERE PRE-ECLAMPSIA IN THIRD TRIMESTER: ICD-10-CM

## 2023-04-03 PROBLEM — Z34.93 NORMAL PREGNANCY IN THIRD TRIMESTER: Status: RESOLVED | Noted: 2022-07-06 | Resolved: 2023-04-03

## 2023-04-03 PROCEDURE — 0503F PR POSTPARTUM CARE VISIT: ICD-10-PCS | Mod: CPTII,S$GLB,, | Performed by: OBSTETRICS & GYNECOLOGY

## 2023-04-03 PROCEDURE — 0503F POSTPARTUM CARE VISIT: CPT | Mod: CPTII,S$GLB,, | Performed by: OBSTETRICS & GYNECOLOGY

## 2023-04-03 PROCEDURE — 99999 PR PBB SHADOW E&M-EST. PATIENT-LVL III: CPT | Mod: PBBFAC,,, | Performed by: OBSTETRICS & GYNECOLOGY

## 2023-04-03 PROCEDURE — 99999 PR PBB SHADOW E&M-EST. PATIENT-LVL III: ICD-10-PCS | Mod: PBBFAC,,, | Performed by: OBSTETRICS & GYNECOLOGY

## 2023-04-03 NOTE — PROGRESS NOTES
"CC: Post-partum follow-up    Yee Adler is a 33 y.o. female  who presents for post-partum visit.  She is S/P a  repeat LTCS due to oligohydramnios .  She and the baby are doing well.  No pain.  No fever.   No bowel / bladder complaints.  Has a slight headache today    Delivery Date: 2023  Delivery MD: Dr. Verito Becker  Gender: male "Huy"  Birth Weight: 7 pounds 1 ounces  Breast Feeding: YES (and formula feeding)  Depression: NO  Contraception: undecided; has not done well with hormonal birth control in the past    Pregnancy was complicated by:  Oligohydramnios  Hx LTCS  Severe preeclampsia in the third trimester (currently on labetalol 100mg bid)  Anemia    /60   Ht 5' 8" (1.727 m)   Wt 95.6 kg (210 lb 12.2 oz)   LMP 2022 (Exact Date)   Breastfeeding Yes Comment: and bottle feeding  BMI 32.05 kg/m²     ROS:  GENERAL: No fever, chills, fatigability.  VULVAR: No pain, no lesions and no itching.  VAGINAL: No relaxation, no itching, no discharge, no abnormal bleeding and no lesions.  ABDOMEN: No abdominal pain. Denies nausea. Denies vomiting. No diarrhea. No constipation  BREAST: Denies pain. No lumps. No discharge.  URINARY: No incontinence, no nocturia, no frequency and no dysuria.  CARDIOVASCULAR: No chest pain. No shortness of breath. No leg cramps.  NEUROLOGICAL: No headaches. No vision changes.    PHYSICAL EXAM:  INCISION:  well-healed and intact  ABDOMEN:  Soft, non-tender, non-distended  VULVA:  Normal, no lesions  CERVIX:  Without lesions, polyps or tenderness.  UTERUS:  Normal size, shape, consistency, no mass or tenderness.  ADNEXA:  Normal in size without mass or tenderness    Yee was seen today for postpartum care.    Diagnoses and all orders for this visit:    Status post repeat low transverse  section    Severe pre-eclampsia in third trimester     Discussed contraception options with patient including progesterone only pills, Depo Provera, Implanon, " Mirena, Kyleena and Sue.  Pt will think about options and will let me know.  BP today 110/60 and pt with headache.  Advised to stop labetalol.  Will return for nurse visit next week for BP check  Okay to resume all regular activities.    No follow-ups on file.

## 2023-04-10 ENCOUNTER — CLINICAL SUPPORT (OUTPATIENT)
Dept: OBSTETRICS AND GYNECOLOGY | Facility: CLINIC | Age: 33
End: 2023-04-10
Payer: COMMERCIAL

## 2023-04-10 VITALS — DIASTOLIC BLOOD PRESSURE: 72 MMHG | SYSTOLIC BLOOD PRESSURE: 130 MMHG

## 2023-04-10 DIAGNOSIS — O14.13 SEVERE PRE-ECLAMPSIA IN THIRD TRIMESTER: Primary | ICD-10-CM

## 2023-04-10 PROCEDURE — 99999 PR PBB SHADOW E&M-EST. PATIENT-LVL II: CPT | Mod: PBBFAC,,,

## 2023-04-10 PROCEDURE — 99999 PR PBB SHADOW E&M-EST. PATIENT-LVL II: ICD-10-PCS | Mod: PBBFAC,,,

## 2023-04-10 NOTE — PROGRESS NOTES
Confirmed the two pt identifiers. Pt advised to monitor BP s/s and to call if she has any concerns. Pt verbalized understanding.

## 2023-05-08 ENCOUNTER — PATIENT MESSAGE (OUTPATIENT)
Dept: OBSTETRICS AND GYNECOLOGY | Facility: CLINIC | Age: 33
End: 2023-05-08
Payer: COMMERCIAL

## 2024-11-25 ENCOUNTER — OFFICE VISIT (OUTPATIENT)
Dept: FAMILY MEDICINE | Facility: CLINIC | Age: 34
End: 2024-11-25
Payer: COMMERCIAL

## 2024-11-25 VITALS
WEIGHT: 216.5 LBS | HEART RATE: 74 BPM | OXYGEN SATURATION: 99 % | TEMPERATURE: 98 F | DIASTOLIC BLOOD PRESSURE: 74 MMHG | BODY MASS INDEX: 32.81 KG/M2 | HEIGHT: 68 IN | SYSTOLIC BLOOD PRESSURE: 126 MMHG

## 2024-11-25 DIAGNOSIS — Z00.00 ANNUAL PHYSICAL EXAM: Primary | ICD-10-CM

## 2024-11-25 PROCEDURE — 1160F RVW MEDS BY RX/DR IN RCRD: CPT | Mod: CPTII,S$GLB,, | Performed by: NURSE PRACTITIONER

## 2024-11-25 PROCEDURE — 1159F MED LIST DOCD IN RCRD: CPT | Mod: CPTII,S$GLB,, | Performed by: NURSE PRACTITIONER

## 2024-11-25 PROCEDURE — 3044F HG A1C LEVEL LT 7.0%: CPT | Mod: CPTII,S$GLB,, | Performed by: NURSE PRACTITIONER

## 2024-11-25 PROCEDURE — 3008F BODY MASS INDEX DOCD: CPT | Mod: CPTII,S$GLB,, | Performed by: NURSE PRACTITIONER

## 2024-11-25 PROCEDURE — 99999 PR PBB SHADOW E&M-EST. PATIENT-LVL III: CPT | Mod: PBBFAC,,, | Performed by: NURSE PRACTITIONER

## 2024-11-25 PROCEDURE — 3074F SYST BP LT 130 MM HG: CPT | Mod: CPTII,S$GLB,, | Performed by: NURSE PRACTITIONER

## 2024-11-25 PROCEDURE — 3078F DIAST BP <80 MM HG: CPT | Mod: CPTII,S$GLB,, | Performed by: NURSE PRACTITIONER

## 2024-11-25 PROCEDURE — 99395 PREV VISIT EST AGE 18-39: CPT | Mod: S$GLB,,, | Performed by: NURSE PRACTITIONER

## 2024-11-29 ENCOUNTER — LAB VISIT (OUTPATIENT)
Dept: LAB | Facility: HOSPITAL | Age: 34
End: 2024-11-29
Attending: NURSE PRACTITIONER
Payer: COMMERCIAL

## 2024-11-29 DIAGNOSIS — Z00.00 ANNUAL PHYSICAL EXAM: ICD-10-CM

## 2024-11-29 LAB
ALBUMIN SERPL BCP-MCNC: 3.3 G/DL (ref 3.5–5.2)
ALP SERPL-CCNC: 74 U/L (ref 40–150)
ALT SERPL W/O P-5'-P-CCNC: 14 U/L (ref 10–44)
ANION GAP SERPL CALC-SCNC: 6 MMOL/L (ref 8–16)
AST SERPL-CCNC: 19 U/L (ref 10–40)
BASOPHILS # BLD AUTO: 0.05 K/UL (ref 0–0.2)
BASOPHILS NFR BLD: 0.8 % (ref 0–1.9)
BILIRUB SERPL-MCNC: 0.1 MG/DL (ref 0.1–1)
BUN SERPL-MCNC: 14 MG/DL (ref 6–20)
CALCIUM SERPL-MCNC: 8.7 MG/DL (ref 8.7–10.5)
CHLORIDE SERPL-SCNC: 109 MMOL/L (ref 95–110)
CHOLEST SERPL-MCNC: 130 MG/DL (ref 120–199)
CHOLEST/HDLC SERPL: 3 {RATIO} (ref 2–5)
CO2 SERPL-SCNC: 23 MMOL/L (ref 23–29)
CREAT SERPL-MCNC: 0.8 MG/DL (ref 0.5–1.4)
DIFFERENTIAL METHOD BLD: ABNORMAL
EOSINOPHIL # BLD AUTO: 0.1 K/UL (ref 0–0.5)
EOSINOPHIL NFR BLD: 1.4 % (ref 0–8)
ERYTHROCYTE [DISTWIDTH] IN BLOOD BY AUTOMATED COUNT: 18.1 % (ref 11.5–14.5)
EST. GFR  (NO RACE VARIABLE): >60 ML/MIN/1.73 M^2
ESTIMATED AVG GLUCOSE: 114 MG/DL (ref 68–131)
GLUCOSE SERPL-MCNC: 101 MG/DL (ref 70–110)
HBA1C MFR BLD: 5.6 % (ref 4–5.6)
HCT VFR BLD AUTO: 33.1 % (ref 37–48.5)
HDLC SERPL-MCNC: 44 MG/DL (ref 40–75)
HDLC SERPL: 33.8 % (ref 20–50)
HGB BLD-MCNC: 10.1 G/DL (ref 12–16)
IMM GRANULOCYTES # BLD AUTO: 0.02 K/UL (ref 0–0.04)
IMM GRANULOCYTES NFR BLD AUTO: 0.3 % (ref 0–0.5)
LDLC SERPL CALC-MCNC: 76.2 MG/DL (ref 63–159)
LYMPHOCYTES # BLD AUTO: 1.8 K/UL (ref 1–4.8)
LYMPHOCYTES NFR BLD: 29.8 % (ref 18–48)
MCH RBC QN AUTO: 21.1 PG (ref 27–31)
MCHC RBC AUTO-ENTMCNC: 30.5 G/DL (ref 32–36)
MCV RBC AUTO: 69 FL (ref 82–98)
MONOCYTES # BLD AUTO: 0.4 K/UL (ref 0.3–1)
MONOCYTES NFR BLD: 6.4 % (ref 4–15)
NEUTROPHILS # BLD AUTO: 3.6 K/UL (ref 1.8–7.7)
NEUTROPHILS NFR BLD: 61.3 % (ref 38–73)
NONHDLC SERPL-MCNC: 86 MG/DL
NRBC BLD-RTO: 0 /100 WBC
PLATELET # BLD AUTO: 305 K/UL (ref 150–450)
PMV BLD AUTO: ABNORMAL FL (ref 9.2–12.9)
POTASSIUM SERPL-SCNC: 3.9 MMOL/L (ref 3.5–5.1)
PROT SERPL-MCNC: 7.1 G/DL (ref 6–8.4)
RBC # BLD AUTO: 4.79 M/UL (ref 4–5.4)
SODIUM SERPL-SCNC: 138 MMOL/L (ref 136–145)
TRIGL SERPL-MCNC: 49 MG/DL (ref 30–150)
WBC # BLD AUTO: 5.9 K/UL (ref 3.9–12.7)

## 2024-11-29 PROCEDURE — 80061 LIPID PANEL: CPT | Performed by: NURSE PRACTITIONER

## 2024-11-29 PROCEDURE — 83036 HEMOGLOBIN GLYCOSYLATED A1C: CPT | Performed by: NURSE PRACTITIONER

## 2024-11-29 PROCEDURE — 80053 COMPREHEN METABOLIC PANEL: CPT | Performed by: NURSE PRACTITIONER

## 2024-11-29 PROCEDURE — 85025 COMPLETE CBC W/AUTO DIFF WBC: CPT | Performed by: NURSE PRACTITIONER

## 2024-11-29 PROCEDURE — 36415 COLL VENOUS BLD VENIPUNCTURE: CPT | Mod: PO | Performed by: NURSE PRACTITIONER

## 2024-12-02 DIAGNOSIS — D64.9 ANEMIA, UNSPECIFIED TYPE: Primary | ICD-10-CM

## 2024-12-02 NOTE — PROGRESS NOTES
Subjective:       Patient ID: Yee Adler is a 34 y.o. female.    Chief Complaint: Annual Exam    History of Present Illness    Patient presents today for an annual exam. She reports frequent urination, especially since the birth of her last child almost two years ago. She experiences nocturia, waking up 2-3 times per night to urinate. She describes back pain associated with urination, which is relieved after voiding. 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 She denies any immediate urge to urinate when not at work, suggesting she can hold her urine when necessary. Her last medical visit was for a six-week postpartum checkup following the birth of her child. She receives annual flu vaccines. She reports maternal history of diabetes and kidney disease requiring dialysis. Maternal grandmother also had diabetes. She notes paternal history of high cholesterol. Paternal grandmother had throat cancer and was a former smoker. She is a non-smoker and non-drinker. She has three sons and denies any plans to conceive within the next year. She attempts to maintain a healthy diet but acknowledges not exercising as much as she would like.      ROS:  General: -fever, -chills, -fatigue, -weight gain, -weight loss  Eyes: -vision changes, -redness, -discharge  ENT: -ear pain, -nasal congestion, -sore throat  Cardiovascular: -chest pain, -palpitations, -lower extremity edema  Respiratory: -cough, -shortness of breath  Gastrointestinal: -abdominal pain, -nausea, -vomiting, -diarrhea, -constipation, -blood in stool  Genitourinary: -dysuria, -hematuria, +frequency, +nocturia, -urgency  Musculoskeletal: -joint pain, -muscle pain, +back pain  Skin: -rash, -lesion  Neurological: -headache, -dizziness, -numbness, -tingling  Psychiatric: -anxiety, -depression, -sleep difficulty        Past Medical History:   Diagnosis Date    Abdominal pain during pregnancy in third trimester 2/16/2023    Anemia 08/14/2014     Oligohydramnios in third trimester 2/16/2023    Severe pre-eclampsia in third trimester 2/16/2023      No current outpatient medications on file prior to visit.     No current facility-administered medications on file prior to visit.          Objective:      Physical Exam    General: In no acute distress.  Head: Normocephalic. Non traumatic.  Eyes: PERRLA. EOMs full. Conjunctivae clear. Fundi grossly normal.  Ears: EACs clear. TMs normal.  Nose: Mucosa pink. Mucosa moist. No obstruction.  Throat: Clear. No exudates. No lesions.  Neck: Supple. No masses. No thyromegaly. No bruits.  Chest: Lungs clear. No rales. No rhonchi. No wheezes.  Heart: RRR. No murmurs. No rubs. No gallops.  Abdomen: Soft. No tenderness. No masses. BS normal.  : Normal external genitalia. No lesions. No discharge. No hernias  noted.  Back: Normal curvature. No scoliosis. No tenderness.  Extremities: Warm. Well perfused. No upper extremity edema. No lower extremity edema. FROM. No deformities. No joint erythema.  Neuro: No focal deficits appreciated. Good muscle tone. Normal response to visual stimuli. Normal response to auditory stimuli.  Skin: Normal. No rashes. No lesions noted.          Assessment/Plan:     1. Annual physical exam       Assessment & Plan    OVERACTIVE BLADDER AND FREQUENT URINATION:  - Assessed frequent urination symptoms, particularly at night, potentially indicating overactive bladder.  - Considered medication options for overactive bladder if symptoms persist.  - Educated on the potential weakening of pelvic floor muscles after childbirth and its effect on urinary control.  - Informed about the possible connection between caffeine intake and frequent urination.  - Patient to consider reducing caffeine intake to help with frequent urination.  - Urine test ordered to check for potential urinary tract infection.  - Planned to check urine sample to rule out urinary tract infection.    PELVIC AND LOWER BACK PAIN:  -  Evaluated lower back pain in relation to urinary symptoms, considering possible arthritis as alternative cause.    FAMILY HISTORY OF DIABETES AND ELEVATED CHOLESTEROL:  - Considered patient's family history of diabetes and elevated cholesterol as significant risk factors.  - Emphasized the importance of exercise in managing diabetes risk.  - Discussed the impact of carbohydrate intake on diabetes management.  - Patient to increase consistency with exercise routine.  - Recommend reducing carbohydrate intake, aiming for 1 meal per day without carbs.  - Fasting lab work ordered to check for diabetes and other health markers.    GENERAL ADULT MEDICAL EXAMINATION:  - Follow up for fasting lab work any day this week, preferably before 8:00 AM.  - Contact the office within 1-2 days after lab results are available.               No follow-ups on file.    This note was generated with the assistance of ambient listening technology. Verbal consent was obtained by the patient and accompanying visitor(s) for the recording of patient appointment to facilitate this note. I attest to having reviewed and edited the generated note for accuracy, though some syntax or spelling errors may persist. Please contact the author of this note for any clarification.

## 2024-12-03 ENCOUNTER — LAB VISIT (OUTPATIENT)
Dept: LAB | Facility: HOSPITAL | Age: 34
End: 2024-12-03
Attending: NURSE PRACTITIONER
Payer: COMMERCIAL

## 2024-12-03 ENCOUNTER — OFFICE VISIT (OUTPATIENT)
Dept: FAMILY MEDICINE | Facility: CLINIC | Age: 34
End: 2024-12-03
Payer: COMMERCIAL

## 2024-12-03 VITALS
OXYGEN SATURATION: 99 % | SYSTOLIC BLOOD PRESSURE: 122 MMHG | HEART RATE: 86 BPM | WEIGHT: 220.81 LBS | BODY MASS INDEX: 33.57 KG/M2 | TEMPERATURE: 97 F | DIASTOLIC BLOOD PRESSURE: 86 MMHG

## 2024-12-03 DIAGNOSIS — S09.90XA HEAD TRAUMA, INITIAL ENCOUNTER: ICD-10-CM

## 2024-12-03 DIAGNOSIS — G44.319 ACUTE POST-TRAUMATIC HEADACHE, NOT INTRACTABLE: Primary | ICD-10-CM

## 2024-12-03 DIAGNOSIS — D64.9 ANEMIA, UNSPECIFIED TYPE: ICD-10-CM

## 2024-12-03 PROCEDURE — 36415 COLL VENOUS BLD VENIPUNCTURE: CPT | Mod: PO | Performed by: NURSE PRACTITIONER

## 2024-12-03 PROCEDURE — 82728 ASSAY OF FERRITIN: CPT | Performed by: NURSE PRACTITIONER

## 2024-12-03 PROCEDURE — 3044F HG A1C LEVEL LT 7.0%: CPT | Mod: CPTII,S$GLB,, | Performed by: NURSE PRACTITIONER

## 2024-12-03 PROCEDURE — 99999 PR PBB SHADOW E&M-EST. PATIENT-LVL III: CPT | Mod: PBBFAC,,, | Performed by: NURSE PRACTITIONER

## 2024-12-03 PROCEDURE — 83540 ASSAY OF IRON: CPT | Performed by: NURSE PRACTITIONER

## 2024-12-03 PROCEDURE — 3008F BODY MASS INDEX DOCD: CPT | Mod: CPTII,S$GLB,, | Performed by: NURSE PRACTITIONER

## 2024-12-03 PROCEDURE — 1160F RVW MEDS BY RX/DR IN RCRD: CPT | Mod: CPTII,S$GLB,, | Performed by: NURSE PRACTITIONER

## 2024-12-03 PROCEDURE — 99214 OFFICE O/P EST MOD 30 MIN: CPT | Mod: S$GLB,,, | Performed by: NURSE PRACTITIONER

## 2024-12-03 PROCEDURE — 1159F MED LIST DOCD IN RCRD: CPT | Mod: CPTII,S$GLB,, | Performed by: NURSE PRACTITIONER

## 2024-12-03 PROCEDURE — 3079F DIAST BP 80-89 MM HG: CPT | Mod: CPTII,S$GLB,, | Performed by: NURSE PRACTITIONER

## 2024-12-03 PROCEDURE — 82607 VITAMIN B-12: CPT | Performed by: NURSE PRACTITIONER

## 2024-12-03 PROCEDURE — 3074F SYST BP LT 130 MM HG: CPT | Mod: CPTII,S$GLB,, | Performed by: NURSE PRACTITIONER

## 2024-12-03 PROCEDURE — 81001 URINALYSIS AUTO W/SCOPE: CPT | Performed by: NURSE PRACTITIONER

## 2024-12-03 NOTE — LETTER
December 3, 2024      [facility]  [address]  [facility] [address]  Phone: [phone]  Fax: [phone]       Patient: Yee Adler   YOB: 1990  Date of Visit: 12/03/2024    To Whom It May Concern:    Santiago Adler  was at [facility] [employer] on 12/03/2024. The patient may return to work on 12/3/2024 with no restrictions. If you have any questions or concerns, or if I can be of further assistance, please do not hesitate to contact me.    Sincerely,    Carmen Gibson MA / Toya Reveles NP

## 2024-12-03 NOTE — PROGRESS NOTES
Subjective:       Patient ID: Yee Adler is a 34 y.o. female.    Chief Complaint: Head Injury    History of Present Illness    Patient presents today with head pain after hitting her head on a car. She describes a dull head pain with tension extending from one area to another. The whole left side of her body feels tense. She experiences tension around the eyes and slight photosensitivity when looking at her phone. She reports nausea that resolves on its own. She denies ringing in ears, vision changes, and loss of consciousness. She expresses concern about a possible concussion. She reports being informed of mild anemia detected in recent labs. She feels this has been a longstanding issue, but notes that her current anemia levels are better than they have been in the past. She denies any specific symptoms related to anemia.      ROS:  General: -fever, -chills, -fatigue, -weight gain, -weight loss  Eyes: -vision changes, -redness, -discharge  ENT: -ear pain, -nasal congestion, -sore throat, -tinnitus  Cardiovascular: -chest pain, -palpitations, -lower extremity edema  Respiratory: -cough, -shortness of breath  Gastrointestinal: -abdominal pain, +nausea, -vomiting, -diarrhea, -constipation, -blood in stool  Genitourinary: -dysuria, -hematuria, -frequency  Musculoskeletal: -joint pain, -muscle pain  Skin: -rash, -lesion  Neurological: -headache, -dizziness, -numbness, -tingling  Psychiatric: -anxiety, -depression, -sleep difficulty  Head: +head pain        Past Medical History:   Diagnosis Date    Abdominal pain during pregnancy in third trimester 2/16/2023    Anemia 08/14/2014    Oligohydramnios in third trimester 2/16/2023    Severe pre-eclampsia in third trimester 2/16/2023      No current outpatient medications on file prior to visit.     No current facility-administered medications on file prior to visit.          Objective:      Physical Exam    General: In no acute distress.  Head: Normocephalic. Non  traumatic.  Eyes: PERRLA. EOMs full. Conjunctivae clear. Fundi grossly normal.  Ears: EACs clear. TMs normal.  Nose: Mucosa pink. Mucosa moist. No obstruction.  Throat: Clear. No exudates. No lesions.  Neck: Supple. No masses. No thyromegaly. No bruits.  Chest: Lungs clear. No rales. No rhonchi. No wheezes.  Heart: RRR. No murmurs. No rubs. No gallops.  Abdomen: Soft. No tenderness. No masses. BS normal.  : Normal external genitalia. No lesions. No discharge. No hernias  noted.  Back: Normal curvature. No scoliosis. No tenderness.  Extremities: Warm. Well perfused. No upper extremity edema. No lower extremity edema. FROM. No deformities. No joint erythema.  Neuro: No focal deficits appreciated. Good muscle tone. Normal response to visual stimuli. Normal response to auditory stimuli.  Skin: Normal. No rashes. No lesions noted.          Assessment/Plan:     1. Acute post-traumatic headache, not intractable    2. Head trauma, initial encounter       Assessment & Plan    HEADACHE:  - Assessed patient for possible concussion after head injury, determined symptoms consistent with mild headache rather than concussion based on patient's ability to work, tolerate light and sound, improvement of symptoms over time, and negative neurological exam findings. Considered CT scan but deemed not medically necessary given patient's improving symptoms and low risk factors. Explained difference between concussion symptoms and current presentation. Discussed how muscle tension can result from jerking motion after head impact. Started Tylenol or ibuprofen as needed for headache pain, safe while breastfeeding. Ordered CT scan of head (patient request, not medically indicated). Instructed patient to contact the office if symptoms worsen.    ANEMIA:  - Evaluated anemia based on previous lab results, ordered additional tests to determine specific type (iron vs B12 deficiency). Informed about anemia workup process and potential causes.  Ordered additional labs to further evaluate anemia (iron and B12 levels).    FOLLOW-UP:  - Scheduled follow up for lab results and next steps in anemia management.               No follow-ups on file.    This note was generated with the assistance of ambient listening technology. Verbal consent was obtained by the patient and accompanying visitor(s) for the recording of patient appointment to facilitate this note. I attest to having reviewed and edited the generated note for accuracy, though some syntax or spelling errors may persist. Please contact the author of this note for any clarification.

## 2024-12-04 DIAGNOSIS — R82.90 ABNORMAL URINALYSIS: Primary | ICD-10-CM

## 2024-12-04 DIAGNOSIS — D50.9 IRON DEFICIENCY ANEMIA, UNSPECIFIED IRON DEFICIENCY ANEMIA TYPE: Primary | ICD-10-CM

## 2024-12-04 LAB
BACTERIA #/AREA URNS AUTO: ABNORMAL /HPF
BILIRUB UR QL STRIP: NEGATIVE
CLARITY UR REFRACT.AUTO: ABNORMAL
COLOR UR AUTO: ABNORMAL
FERRITIN SERPL-MCNC: 10 NG/ML (ref 20–300)
GLUCOSE UR QL STRIP: NEGATIVE
HGB UR QL STRIP: ABNORMAL
HYALINE CASTS UR QL AUTO: 0 /LPF
IRON SERPL-MCNC: 34 UG/DL (ref 30–160)
KETONES UR QL STRIP: NEGATIVE
LEUKOCYTE ESTERASE UR QL STRIP: ABNORMAL
MICROSCOPIC COMMENT: ABNORMAL
NITRITE UR QL STRIP: NEGATIVE
PH UR STRIP: 7 [PH] (ref 5–8)
PROT UR QL STRIP: ABNORMAL
RBC #/AREA URNS AUTO: 1 /HPF (ref 0–4)
SATURATED IRON: 7 % (ref 20–50)
SP GR UR STRIP: 1.02 (ref 1–1.03)
SQUAMOUS #/AREA URNS AUTO: 22 /HPF
TOTAL IRON BINDING CAPACITY: 472 UG/DL (ref 250–450)
TRANSFERRIN SERPL-MCNC: 319 MG/DL (ref 200–375)
URN SPEC COLLECT METH UR: ABNORMAL
VIT B12 SERPL-MCNC: 924 PG/ML (ref 210–950)
WBC #/AREA URNS AUTO: 67 /HPF (ref 0–5)
WBC CLUMPS UR QL AUTO: ABNORMAL

## 2024-12-04 RX ORDER — FERROUS SULFATE 325(65) MG
325 TABLET ORAL 2 TIMES DAILY
Qty: 180 TABLET | Refills: 1 | Status: SHIPPED | OUTPATIENT
Start: 2024-12-04

## 2024-12-06 ENCOUNTER — OFFICE VISIT (OUTPATIENT)
Dept: OBSTETRICS AND GYNECOLOGY | Facility: CLINIC | Age: 34
End: 2024-12-06
Payer: COMMERCIAL

## 2024-12-06 VITALS
DIASTOLIC BLOOD PRESSURE: 86 MMHG | SYSTOLIC BLOOD PRESSURE: 126 MMHG | WEIGHT: 220.44 LBS | BODY MASS INDEX: 33.41 KG/M2 | HEIGHT: 68 IN

## 2024-12-06 DIAGNOSIS — N92.0 SPOTTING BETWEEN MENSES: ICD-10-CM

## 2024-12-06 DIAGNOSIS — Z11.3 SCREEN FOR STD (SEXUALLY TRANSMITTED DISEASE): ICD-10-CM

## 2024-12-06 DIAGNOSIS — N76.0 ACUTE VAGINITIS: ICD-10-CM

## 2024-12-06 DIAGNOSIS — R39.9 UTI SYMPTOMS: Primary | ICD-10-CM

## 2024-12-06 LAB
BILIRUBIN, UA POC OHS: NEGATIVE
BLOOD, UA POC OHS: ABNORMAL
CLARITY, UA POC OHS: CLEAR
COLOR, UA POC OHS: YELLOW
GLUCOSE, UA POC OHS: NEGATIVE
KETONES, UA POC OHS: NEGATIVE
LEUKOCYTES, UA POC OHS: ABNORMAL
NITRITE, UA POC OHS: NEGATIVE
PH, UA POC OHS: 7.5
PROTEIN, UA POC OHS: NEGATIVE
SPECIFIC GRAVITY, UA POC OHS: 1.02
UROBILINOGEN, UA POC OHS: 0.2

## 2024-12-06 PROCEDURE — 3044F HG A1C LEVEL LT 7.0%: CPT | Mod: CPTII,S$GLB,,

## 2024-12-06 PROCEDURE — 1159F MED LIST DOCD IN RCRD: CPT | Mod: CPTII,S$GLB,,

## 2024-12-06 PROCEDURE — 0352U VAGINOSIS SCREEN BY DNA PROBE: CPT

## 2024-12-06 PROCEDURE — 99999 PR PBB SHADOW E&M-EST. PATIENT-LVL III: CPT | Mod: PBBFAC,,,

## 2024-12-06 PROCEDURE — 81002 URINALYSIS NONAUTO W/O SCOPE: CPT | Mod: S$GLB,,,

## 2024-12-06 PROCEDURE — 3079F DIAST BP 80-89 MM HG: CPT | Mod: CPTII,S$GLB,,

## 2024-12-06 PROCEDURE — 1160F RVW MEDS BY RX/DR IN RCRD: CPT | Mod: CPTII,S$GLB,,

## 2024-12-06 PROCEDURE — 87086 URINE CULTURE/COLONY COUNT: CPT

## 2024-12-06 PROCEDURE — 3008F BODY MASS INDEX DOCD: CPT | Mod: CPTII,S$GLB,,

## 2024-12-06 PROCEDURE — 99213 OFFICE O/P EST LOW 20 MIN: CPT | Mod: 25,S$GLB,,

## 2024-12-06 PROCEDURE — 87491 CHLMYD TRACH DNA AMP PROBE: CPT

## 2024-12-06 PROCEDURE — 3074F SYST BP LT 130 MM HG: CPT | Mod: CPTII,S$GLB,,

## 2024-12-06 RX ORDER — NITROFURANTOIN 25; 75 MG/1; MG/1
100 CAPSULE ORAL 2 TIMES DAILY
Qty: 10 CAPSULE | Refills: 0 | Status: SHIPPED | OUTPATIENT
Start: 2024-12-06 | End: 2024-12-11

## 2024-12-06 NOTE — LETTER
December 6, 2024      [facility] 2nd Fl  [address] [facility]  [place] [address]  Phone: [phone]  Fax: [phone]       Patient: Yee Adler   YOB: 1990  Date of Visit: 12/06/2024    To Whom It May Concern:    Yee Adler  was at [facility] on 12/06/2024. The patient may return to work/school on 12/6/2024 with no restrictions. If you have any questions or concerns, or if I can be of further assistance, please do not hesitate to contact me.    Sincerely,    Mony Good NP

## 2024-12-07 LAB — BACTERIA UR CULT: NORMAL

## 2024-12-10 ENCOUNTER — PATIENT MESSAGE (OUTPATIENT)
Dept: OBSTETRICS AND GYNECOLOGY | Facility: CLINIC | Age: 34
End: 2024-12-10
Payer: COMMERCIAL

## 2024-12-10 NOTE — PROGRESS NOTES
Subjective:       Patient ID: Yee Adler is a 34 y.o. female.    Chief Complaint:  Menorrhagia      History of Present Illness  HPI  Vaginal Discharge and Irritation  Patient presents for vaginal discharge check. Sexual history reviewed with the patient. STD exposure: denies knowledge of risky exposure.  Previous history of STD:  none. Current symptoms include vaginal discharge: scant and malodorous, vaginal irritation: moderate, dysuria: mild, abnormal bleeding.  Contraception: none.    GYN & OB History  Patient's last menstrual period was 2024 (exact date).   Date of Last Pap: 2022    OB History    Para Term  AB Living   3 3 3     3   SAB IAB Ectopic Multiple Live Births         0 3      # Outcome Date GA Lbr Jose G/2nd Weight Sex Type Anes PTL Lv   3 Term 23 39w3d  3.2 kg (7 lb 0.9 oz) M , V Spinal N NILO      Complications: Oligohydramnios   2 Term 16 41w3d  3.13 kg (6 lb 14.4 oz) M Vag-Spont None N NILO   1 Term 10/06/14 41w0d  3.419 kg (7 lb 8.6 oz) M CS-LTranv Spinal N NILO       Review of Systems  Review of Systems   Constitutional: Negative.    HENT: Negative.     Eyes: Negative.    Respiratory: Negative.     Cardiovascular: Negative.    Gastrointestinal: Negative.    Genitourinary:  Positive for dysuria, vaginal discharge, vaginal pain and vaginal odor.   Musculoskeletal: Negative.    Integumentary:  Negative.   Neurological: Negative.    Hematological: Negative.    Psychiatric/Behavioral: Negative.     All other systems reviewed and are negative.  Breast: negative.            Objective:      Physical Exam:   Constitutional: She is oriented to person, place, and time. She appears well-developed and well-nourished. No distress.    HENT:   Head: Normocephalic and atraumatic.    Eyes: Pupils are equal, round, and reactive to light. Conjunctivae and EOM are normal.     Cardiovascular:  Normal rate.             Pulmonary/Chest: Effort normal.        Abdominal:  Soft. She exhibits no distension. There is no abdominal tenderness. There is no rebound and no guarding. Hernia confirmed negative in the right inguinal area and confirmed negative in the left inguinal area.     Genitourinary:    Inguinal canal, uterus, right adnexa and left adnexa normal.   Rectum:      No external hemorrhoid.      Pelvic exam was performed with patient supine.   The external female genitalia was normal.   No external genitalia lesions identified,Genitalia hair distrobution normal .     Labial bartholins normal.There is no rash, tenderness, lesion or injury on the right labia. There is no rash, tenderness, lesion or injury on the left labia. Cervix is normal. Right adnexum displays no mass, no tenderness and no fullness. Left adnexum displays no mass, no tenderness and no fullness. There is vaginal discharge in the vagina. No erythema, tenderness or bleeding in the vagina.    No foreign body in the vagina.      No signs of injury in the vagina.   Cervix exhibits no motion tenderness, no lesion, no friability, no tenderness and no polyp. Uerus contour normal  Uterus is not enlarged and not tender. Normal urethral meatus.Urethral Meatus exhibits: no urethral lesionUrethra findings: no urethral mass, no tenderness, no urethral scarring and no prolapsedBladder findings: no bladder distention and no bladder tenderness          Musculoskeletal: Normal range of motion and moves all extremeties.      Lymphadenopathy: No inguinal adenopathy noted on the right or left side.    Neurological: She is alert and oriented to person, place, and time.    Skin: Skin is warm and dry. No rash noted. She is not diaphoretic. No erythema. No pallor.    Psychiatric: She has a normal mood and affect. Her behavior is normal. Judgment and thought content normal.             Assessment:        1. UTI symptoms    2. Spotting between menses    3. Screen for STD (sexually transmitted disease)    4. Acute vaginitis                Plan:   Urine dip, +leucocytes   Will treat suspected UTI with Macrobid   Specimen sent to lab for culture, will change antibiotic if indicated   UTI prevention discussed: Make sure to wipe from front to back, void after intercourse, avoid bubble baths, void regularly, and increase water intake.  RTC if symptoms fail to improve or worsen    Diagnosis and orders this visit:  UTI symptoms  -     POCT Urinalysis(Instrument)  -     nitrofurantoin, macrocrystal-monohydrate, (MACROBID) 100 MG capsule; Take 1 capsule (100 mg total) by mouth 2 (two) times daily. for 5 days  Dispense: 10 capsule; Refill: 0  -     CULTURE, URINE    Spotting between menses    Screen for STD (sexually transmitted disease)  -     C. trachomatis/N. gonorrhoeae by AMP DNA    Acute vaginitis  -     Vaginosis Screen by DNA Probe           Mony Good, NP

## 2024-12-12 LAB
C TRACH DNA SPEC QL NAA+PROBE: NOT DETECTED
N GONORRHOEA DNA SPEC QL NAA+PROBE: NOT DETECTED

## 2024-12-16 DIAGNOSIS — B96.89 BACTERIAL VAGINITIS: Primary | ICD-10-CM

## 2024-12-16 DIAGNOSIS — N76.0 BACTERIAL VAGINITIS: Primary | ICD-10-CM

## 2024-12-16 RX ORDER — METRONIDAZOLE 500 MG/1
500 TABLET ORAL 2 TIMES DAILY
Qty: 14 TABLET | Refills: 0 | Status: SHIPPED | OUTPATIENT
Start: 2024-12-16 | End: 2024-12-23

## 2025-01-02 ENCOUNTER — OFFICE VISIT (OUTPATIENT)
Dept: DERMATOLOGY | Facility: CLINIC | Age: 35
End: 2025-01-02
Payer: COMMERCIAL

## 2025-01-02 DIAGNOSIS — B35.1 ONYCHOMYCOSIS: ICD-10-CM

## 2025-01-02 DIAGNOSIS — L60.8 NAIL DISCOLORATION: Primary | ICD-10-CM

## 2025-01-02 RX ORDER — CICLOPIROX 80 MG/ML
SOLUTION TOPICAL
Qty: 6.6 ML | Refills: 6 | Status: SHIPPED | OUTPATIENT
Start: 2025-01-02

## 2025-01-02 NOTE — PROGRESS NOTES
Subjective:      Patient ID:  Yee Adler is a 34 y.o. female who presents for No chief complaint on file.    The patient location is: [place]  The chief complaint leading to consultation is: nail discoloration    Visit type: audiovisual    Face to Face time with patient: 10  15 minutes of total time spent on the encounter, which includes face to face time and non-face to face time preparing to see the patient (eg, review of tests), Obtaining and/or reviewing separately obtained history, Documenting clinical information in the electronic or other health record, Independently interpreting results (not separately reported) and communicating results to the patient/family/caregiver, or Care coordination (not separately reported).         Each patient to whom he or she provides medical services by telemedicine is:  (1) informed of the relationship between the physician and patient and the respective role of any other health care provider with respect to management of the patient; and (2) notified that he or she may decline to receive medical services by telemedicine and may withdraw from such care at any time.    Notes:     C/o nail discoloration, noticed it after pedicure in September. Endorses regular pat/pedi's every 4 weeks. Denies trauma or injury to area. Denies FHX melanoma.  Has been using an otc tea tree nail gel and believes there has been some improvement in discoloration.  Against oral meds.         Review of Systems   Constitutional:  Negative for fever and chills.   Gastrointestinal:  Negative for nausea and vomiting.   Skin:  Positive for activity-related sunscreen use. Negative for itching, rash, dry skin, sun sensitivity, daily sunscreen use, recent sunburn and dry lips.   Hematologic/Lymphatic: Does not bruise/bleed easily.       Objective:   Physical Exam   Constitutional: She appears well-developed and well-nourished. No distress.   Neurological: She is alert and oriented to person, place,  and time. She is not disoriented.   Psychiatric: She has a normal mood and affect.   Skin:   Areas Examined (abnormalities noted in diagram):   Head / Face Inspection Performed       Diagram Legend     Erythematous scaling macule/papule c/w actinic keratosis       Vascular papule c/w angioma      Pigmented verrucoid papule/plaque c/w seborrheic keratosis      Yellow umbilicated papule c/w sebaceous hyperplasia      Irregularly shaped tan macule c/w lentigo     1-2 mm smooth white papules consistent with Milia      Movable subcutaneous cyst with punctum c/w epidermal inclusion cyst      Subcutaneous movable cyst c/w pilar cyst      Firm pink to brown papule c/w dermatofibroma      Pedunculated fleshy papule(s) c/w skin tag(s)      Evenly pigmented macule c/w junctional nevus     Mildly variegated pigmented, slightly irregular-bordered macule c/w mildly atypical nevus      Flesh colored to evenly pigmented papule c/w intradermal nevus       Pink pearly papule/plaque c/w basal cell carcinoma      Erythematous hyperkeratotic cursted plaque c/w SCC      Surgical scar with no sign of skin cancer recurrence      Open and closed comedones      Inflammatory papules and pustules      Verrucoid papule consistent consistent with wart     Erythematous eczematous patches and plaques     Dystrophic onycholytic nail with subungual debris c/w onychomycosis     Umbilicated papule    Erythematous-base heme-crusted tan verrucoid plaque consistent with inflamed seborrheic keratosis     Erythematous Silvery Scaling Plaque c/w Psoriasis     See annotation      Assessment / Plan:        Nail discoloration  -     ciclopirox (PENLAC) 8 % Soln; Apply to nail and nail fold once daily for up to 1 year  Dispense: 6.6 mL; Refill: 6  Onychomycosis  -     ciclopirox (PENLAC) 8 % Soln; Apply to nail and nail fold once daily for up to 1 year  Dispense: 6.6 mL; Refill: 6  Ddx: less likley pigmented lesion vs. Periungal melanoma  Discussed rare  melanoma in skin of color and the MCL. Reviewed red flags. Advised trial of above + vinegar soaks and fomite reduction. Discussed timeline of ~ 18 months w/treatment for nail clearing. Pt wishes to avoid oral meds due to concern for side effects.  Would reconsider jublia in future.            No follow-ups on file.

## 2025-01-02 NOTE — PATIENT INSTRUCTIONS
Start water-vinegar soaks 3 nights/week on feet for 5-10 minutes    Launder socks in hot water    Wear open toe shoes when possible    Wear socks with closed toe shoes    Spray shoes 3 times/week with over the counter antifungal foot spray (ie-Tenactin)

## 2025-06-23 ENCOUNTER — TELEPHONE (OUTPATIENT)
Dept: OBSTETRICS AND GYNECOLOGY | Facility: CLINIC | Age: 35
End: 2025-06-23

## 2025-06-23 NOTE — TELEPHONE ENCOUNTER
Copied from CRM #[id number]. Topic: Appointments - Appointment Access  >> Jun 23, 2025 10:21 AM Christina wrote:  Type: Confirming appointment    Name of Caller:Yee Adler   She wants to let you all know she could not go because she went to wrong location, but she tried to go. She wants to keep her appointment for Wednesday June/25/2025  Would the patient rather a call back or a response via [facility]? Call back  Best Call Back Number:[phone]  Thanks!

## 2025-06-23 NOTE — TELEPHONE ENCOUNTER
Spoke to patient and she just wanted to let us know that she rescheduled her appointment to Wednesday. Call ended.

## 2025-06-25 ENCOUNTER — LAB VISIT (OUTPATIENT)
Dept: LAB | Facility: HOSPITAL | Age: 35
End: 2025-06-25
Payer: COMMERCIAL

## 2025-06-25 ENCOUNTER — OFFICE VISIT (OUTPATIENT)
Dept: OBSTETRICS AND GYNECOLOGY | Facility: CLINIC | Age: 35
End: 2025-06-25
Payer: COMMERCIAL

## 2025-06-25 VITALS
HEIGHT: 68 IN | WEIGHT: 224 LBS | SYSTOLIC BLOOD PRESSURE: 132 MMHG | BODY MASS INDEX: 33.95 KG/M2 | DIASTOLIC BLOOD PRESSURE: 78 MMHG

## 2025-06-25 DIAGNOSIS — N91.2 AMENORRHEA: ICD-10-CM

## 2025-06-25 DIAGNOSIS — Z01.419 ENCOUNTER FOR ANNUAL ROUTINE GYNECOLOGICAL EXAMINATION: Primary | ICD-10-CM

## 2025-06-25 DIAGNOSIS — Z12.4 CERVICAL CANCER SCREENING: ICD-10-CM

## 2025-06-25 LAB
ABSOLUTE EOSINOPHIL (OHS): 0.08 K/UL
ABSOLUTE MONOCYTE (OHS): 0.54 K/UL (ref 0.3–1)
ABSOLUTE NEUTROPHIL COUNT (OHS): 4.34 K/UL (ref 1.8–7.7)
BASOPHILS # BLD AUTO: 0.04 K/UL
BASOPHILS NFR BLD AUTO: 0.6 %
ERYTHROCYTE [DISTWIDTH] IN BLOOD BY AUTOMATED COUNT: 19.7 % (ref 11.5–14.5)
FSH SERPL-ACNC: 4.42 MIU/ML
HCG INTACT+B SERPL-ACNC: <2.42 MIU/ML
HCT VFR BLD AUTO: 34.7 % (ref 37–48.5)
HGB BLD-MCNC: 10.8 GM/DL (ref 12–16)
IMM GRANULOCYTES # BLD AUTO: 0.02 K/UL (ref 0–0.04)
IMM GRANULOCYTES NFR BLD AUTO: 0.3 % (ref 0–0.5)
LYMPHOCYTES # BLD AUTO: 2.22 K/UL (ref 1–4.8)
MCH RBC QN AUTO: 21.2 PG (ref 27–31)
MCHC RBC AUTO-ENTMCNC: 31.1 G/DL (ref 32–36)
MCV RBC AUTO: 68 FL (ref 82–98)
NUCLEATED RBC (/100WBC) (OHS): 0 /100 WBC
PLATELET # BLD AUTO: 294 K/UL (ref 150–450)
PMV BLD AUTO: 10.5 FL (ref 9.2–12.9)
RBC # BLD AUTO: 5.09 M/UL (ref 4–5.4)
RELATIVE EOSINOPHIL (OHS): 1.1 %
RELATIVE LYMPHOCYTE (OHS): 30.7 % (ref 18–48)
RELATIVE MONOCYTE (OHS): 7.5 % (ref 4–15)
RELATIVE NEUTROPHIL (OHS): 59.8 % (ref 38–73)
TSH SERPL-ACNC: 1.5 UIU/ML (ref 0.4–4)
WBC # BLD AUTO: 7.24 K/UL (ref 3.9–12.7)

## 2025-06-25 PROCEDURE — 85025 COMPLETE CBC W/AUTO DIFF WBC: CPT

## 2025-06-25 PROCEDURE — 99999 PR PBB SHADOW E&M-EST. PATIENT-LVL III: CPT | Mod: PBBFAC,,,

## 2025-06-25 PROCEDURE — 84443 ASSAY THYROID STIM HORMONE: CPT

## 2025-06-25 PROCEDURE — 83001 ASSAY OF GONADOTROPIN (FSH): CPT

## 2025-06-25 PROCEDURE — 36415 COLL VENOUS BLD VENIPUNCTURE: CPT

## 2025-06-25 PROCEDURE — 84702 CHORIONIC GONADOTROPIN TEST: CPT

## 2025-06-25 RX ORDER — LANOLIN ALCOHOL/MO/W.PET/CERES
400 CREAM (GRAM) TOPICAL DAILY
COMMUNITY

## 2025-06-25 NOTE — PROGRESS NOTES
Subjective:       Patient ID: Yee Adler is a 35 y.o. female.    Chief Complaint:  Well Woman      History of Present Illness  HPI  Annual Exam-Premenopausal  Patient presents for annual exam. The patient has no complaints today. The patient is sexually active, MM with male partner. GYN screening history: last pap: approximate date 22 and was normal. The patient wears seatbelts: yes. The patient participates in regular exercise: yes, recently started weight lifting and cardio 4x a week. Has the patient ever been transfused or tattooed?: not asked.     Menses are usually regular with periods lasting 6 days.  Heavy bleeding the first 2 days    Reports no cycle since , used plan B May 1 and still has not had a cycle   This is her first time using a Plan B since her 20s, she usually avoids intercourse during ovulations    GYN & OB History  Patient's last menstrual period was 2025.   Date of Last Pap: 2022    OB History    Para Term  AB Living   3 3 3   3   SAB IAB Ectopic Multiple Live Births      0 3      # Outcome Date GA Lbr Jose G/2nd Weight Sex Type Anes PTL Lv   3 Term 23 39w3d  3.2 kg (7 lb 0.9 oz) M , V Spinal N NILO      Complications: Oligohydramnios   2 Term 16 41w3d  3.13 kg (6 lb 14.4 oz) M Vag-Spont None N NILO   1 Term 10/06/14 41w0d  3.419 kg (7 lb 8.6 oz) M CS-LTranv Spinal N NILO       Review of Systems  Review of Systems   Constitutional: Negative.    HENT: Negative.     Eyes: Negative.    Respiratory: Negative.     Cardiovascular: Negative.    Gastrointestinal: Negative.    Genitourinary: Negative.    Musculoskeletal: Negative.    Integumentary:  Negative.   Neurological: Negative.    Hematological: Negative.    Psychiatric/Behavioral: Negative.     All other systems reviewed and are negative.  Breast: negative.            Objective:      Physical Exam:   Constitutional: She is oriented to person, place, and time. She appears well-developed  and well-nourished.    HENT:   Head: Normocephalic and atraumatic.   Nose: Nose normal.    Eyes: Pupils are equal, round, and reactive to light. Conjunctivae and EOM are normal.     Cardiovascular:  Normal rate and regular rhythm.             Pulmonary/Chest: Effort normal. She has no decreased breath sounds. She has no rhonchi. Right breast exhibits no inverted nipple, no mass, no nipple discharge, no tenderness and no bleeding. Left breast exhibits no inverted nipple, no mass, no nipple discharge, no tenderness and no bleeding. Breasts are symmetrical.        Abdominal: Soft. There is no abdominal tenderness.     Genitourinary:    Inguinal canal, vagina, uterus, right adnexa and left adnexa normal.      Pelvic exam was performed with patient supine.   The external female genitalia was normal.   No external genitalia lesions identified,Genitalia hair distrobution normal .     Labial bartholins normal.Cervix is normal. Right adnexum displays no mass and no tenderness. Left adnexum displays no mass and no tenderness. No vaginal discharge, tenderness or bleeding in the vagina. Vagina was moist.Cervix exhibits no motion tenderness, no discharge and no tenderness.    pap smear completedUerus contour normal  Uterus is not tender. Uterus size: 8 cm.Normal urethral meatus.          Musculoskeletal: Normal range of motion and moves all extremeties.       Neurological: She is alert and oriented to person, place, and time.    Skin: Skin is warm and dry.    Psychiatric: She has a normal mood and affect. Her speech is normal and behavior is normal. Mood, judgment and thought content normal.             Assessment:        1. Encounter for annual routine gynecological examination    2. Cervical cancer screening    3. Amenorrhea               Plan:   Continue annual well woman exam.  Pap collected. Patient was counseled today on ASCCP screening guidelines (pap smear every 3 years in low risk patients) and recommendations for  annual pelvic exams and clinical breast exams, yearly mammograms starting at age 40; and to see her PCP for other healthcare maintenance.   Declines contraception at this time   Will check bHCG, FSH and TSH, if all normal since it has been almost 3 months will send Provera      Diagnosis and orders this visit:  Encounter for annual routine gynecological examination    Cervical cancer screening  -     Liquid-Based Pap Smear, Screening    Amenorrhea  -     CBC Auto Differential; Future; Expected date: 06/25/2025  -     TSH; Future; Expected date: 06/25/2025  -     Follicle Stimulating Hormone; Future; Expected date: 06/25/2025  -     HCG, Quantitative; Future; Expected date: 06/25/2025  -     POCT urine pregnancy         Mony Good, NP

## 2025-06-26 ENCOUNTER — RESULTS FOLLOW-UP (OUTPATIENT)
Dept: OBSTETRICS AND GYNECOLOGY | Facility: CLINIC | Age: 35
End: 2025-06-26

## 2025-06-26 DIAGNOSIS — N91.2 AMENORRHEA: Primary | ICD-10-CM

## 2025-06-26 RX ORDER — MEDROXYPROGESTERONE ACETATE 10 MG/1
10 TABLET ORAL DAILY
Qty: 10 TABLET | Refills: 0 | Status: SHIPPED | OUTPATIENT
Start: 2025-06-26 | End: 2025-07-06

## 2025-07-10 ENCOUNTER — HOSPITAL ENCOUNTER (EMERGENCY)
Facility: HOSPITAL | Age: 35
Discharge: HOME | End: 2025-07-10
Attending: FAMILY MEDICINE
Payer: COMMERCIAL

## 2025-07-10 VITALS
SYSTOLIC BLOOD PRESSURE: 135 MMHG | RESPIRATION RATE: 16 BRPM | TEMPERATURE: 98 F | OXYGEN SATURATION: 100 % | HEART RATE: 69 BPM | WEIGHT: 222.38 LBS | DIASTOLIC BLOOD PRESSURE: 82 MMHG | BODY MASS INDEX: 33.82 KG/M2

## 2025-07-10 DIAGNOSIS — R07.9 CHEST PAIN: ICD-10-CM

## 2025-07-10 DIAGNOSIS — M79.602 LEFT ARM PAIN: Primary | ICD-10-CM

## 2025-07-10 LAB
ABSOLUTE EOSINOPHIL (OHS): 0.06 K/UL
ABSOLUTE MONOCYTE (OHS): 0.36 K/UL (ref 0.3–1)
ABSOLUTE NEUTROPHIL COUNT (OHS): 3.37 K/UL (ref 1.8–7.7)
ALBUMIN SERPL BCP-MCNC: 3.8 G/DL (ref 3.5–5.2)
ALP SERPL-CCNC: 79 UNIT/L (ref 40–150)
ALT SERPL W/O P-5'-P-CCNC: 14 UNIT/L (ref 10–44)
ANION GAP (OHS): 10 MMOL/L (ref 8–16)
AST SERPL-CCNC: 15 UNIT/L (ref 11–45)
BASOPHILS # BLD AUTO: 0.04 K/UL
BASOPHILS NFR BLD AUTO: 0.7 %
BILIRUB SERPL-MCNC: 0.3 MG/DL (ref 0.1–1)
BNP SERPL-MCNC: 16 PG/ML (ref 0–99)
BUN SERPL-MCNC: 9 MG/DL (ref 6–20)
CALCIUM SERPL-MCNC: 9 MG/DL (ref 8.7–10.5)
CHLORIDE SERPL-SCNC: 105 MMOL/L (ref 95–110)
CO2 SERPL-SCNC: 22 MMOL/L (ref 23–29)
CREAT SERPL-MCNC: 0.7 MG/DL (ref 0.5–1.4)
D DIMER PPP IA.FEU-MCNC: <0.19 MG/L FEU
ERYTHROCYTE [DISTWIDTH] IN BLOOD BY AUTOMATED COUNT: 18.3 % (ref 11.5–14.5)
GFR SERPLBLD CREATININE-BSD FMLA CKD-EPI: >60 ML/MIN/1.73/M2
GLUCOSE SERPL-MCNC: 101 MG/DL (ref 70–110)
HCT VFR BLD AUTO: 34.8 % (ref 37–48.5)
HCV AB SERPL QL IA: NEGATIVE
HGB BLD-MCNC: 10.5 GM/DL (ref 12–16)
HIV 1+2 AB+HIV1 P24 AG SERPL QL IA: NEGATIVE
IMM GRANULOCYTES # BLD AUTO: 0.01 K/UL (ref 0–0.04)
IMM GRANULOCYTES NFR BLD AUTO: 0.2 % (ref 0–0.5)
LYMPHOCYTES # BLD AUTO: 1.8 K/UL (ref 1–4.8)
MCH RBC QN AUTO: 20.6 PG (ref 27–31)
MCHC RBC AUTO-ENTMCNC: 30.2 G/DL (ref 32–36)
MCV RBC AUTO: 68 FL (ref 82–98)
NUCLEATED RBC (/100WBC) (OHS): 0 /100 WBC
PLATELET # BLD AUTO: 331 K/UL (ref 150–450)
PMV BLD AUTO: 9.9 FL (ref 9.2–12.9)
POTASSIUM SERPL-SCNC: 3.6 MMOL/L (ref 3.5–5.1)
PROT SERPL-MCNC: 8.4 GM/DL (ref 6–8.4)
RBC # BLD AUTO: 5.1 M/UL (ref 4–5.4)
RELATIVE EOSINOPHIL (OHS): 1.1 %
RELATIVE LYMPHOCYTE (OHS): 31.9 % (ref 18–48)
RELATIVE MONOCYTE (OHS): 6.4 % (ref 4–15)
RELATIVE NEUTROPHIL (OHS): 59.7 % (ref 38–73)
SODIUM SERPL-SCNC: 137 MMOL/L (ref 136–145)
TROPONIN I SERPL DL<=0.01 NG/ML-MCNC: <0.006 NG/ML
WBC # BLD AUTO: 5.64 K/UL (ref 3.9–12.7)

## 2025-07-10 PROCEDURE — 80053 COMPREHEN METABOLIC PANEL: CPT | Performed by: NURSE PRACTITIONER

## 2025-07-10 PROCEDURE — 87389 HIV-1 AG W/HIV-1&-2 AB AG IA: CPT | Performed by: EMERGENCY MEDICINE

## 2025-07-10 PROCEDURE — 85379 FIBRIN DEGRADATION QUANT: CPT | Performed by: NURSE PRACTITIONER

## 2025-07-10 PROCEDURE — 93005 ELECTROCARDIOGRAM TRACING: CPT

## 2025-07-10 PROCEDURE — 99285 EMERGENCY DEPT VISIT HI MDM: CPT | Mod: 25

## 2025-07-10 PROCEDURE — 83880 ASSAY OF NATRIURETIC PEPTIDE: CPT | Performed by: NURSE PRACTITIONER

## 2025-07-10 PROCEDURE — 84484 ASSAY OF TROPONIN QUANT: CPT | Performed by: NURSE PRACTITIONER

## 2025-07-10 PROCEDURE — 93010 ELECTROCARDIOGRAM REPORT: CPT | Mod: ,,, | Performed by: INTERNAL MEDICINE

## 2025-07-10 PROCEDURE — 86803 HEPATITIS C AB TEST: CPT | Performed by: EMERGENCY MEDICINE

## 2025-07-10 PROCEDURE — 85025 COMPLETE CBC W/AUTO DIFF WBC: CPT | Performed by: NURSE PRACTITIONER

## 2025-07-10 NOTE — ED PROVIDER NOTES
Encounter Date: 7/10/2025       History     Chief Complaint   Patient presents with    Chest Pain     Intermittent midsternaln chest pain that radiates to left shoulder causing left arm tension. Pt states chest pain has been going on for several weeks and was seen by PCP a month ago where she received an echocardiogram.      35-year-old female with no significant past medical history presents to ER complaining of chest pressure and discomfort to left arm described as tension for the last several weeks.  Reports chest pain changed last night and became sharp and intermittent in nature.  Patient reports symptoms worse at nighttime when lying down.  No treatment prior to arrival.  Patient reports she has seen primary care provider and had echocardiogram in office.  Is scheduled for sleep study at the end of the month.  Patient reports she has not yet seen Cardiology.        Review of patient's allergies indicates:  No Known Allergies  Past Medical History:   Diagnosis Date    Abdominal pain during pregnancy in third trimester 2023    Anemia 2014    Oligohydramnios in third trimester 2023    Severe pre-eclampsia in third trimester 2023     Past Surgical History:   Procedure Laterality Date     SECTION       SECTION N/A 2023    Procedure:  SECTION;  Surgeon: Verito Becker MD;  Location: [facility] L&D;  Service: OB/GYN;  Laterality: N/A;    WISDOM TOOTH EXTRACTION       Family History   Problem Relation Name Age of Onset    Stroke Maternal Grandmother      Diabetes Mother      Hypertension Mother      Breast cancer Neg Hx      Cancer Neg Hx      Colon cancer Neg Hx      Eclampsia Neg Hx      Miscarriages / Stillbirths Neg Hx      Ovarian cancer Neg Hx       labor Neg Hx       Social History[1]  Review of Systems   Constitutional:  Negative for fever.   HENT:  Negative for sore throat.    Respiratory:  Negative for shortness of breath.    Cardiovascular:  Positive for  chest pain.   Gastrointestinal:  Negative for nausea.   Genitourinary:  Negative for dysuria.   Musculoskeletal:  Negative for back pain.        Positive arm pain   Skin:  Negative for rash.   Neurological:  Negative for dizziness and weakness.   Hematological:  Does not bruise/bleed easily.       Physical Exam     Initial Vitals [07/10/25 1113]   BP Pulse Resp Temp SpO2   (!) 160/73 79 15 97.9 °F (36.6 °C) 100 %      MAP       --         Physical Exam    Nursing note and vitals reviewed.  Constitutional: She appears well-developed and well-nourished.   HENT:   Head: Normocephalic and atraumatic.   Eyes: Conjunctivae and EOM are normal. Pupils are equal, round, and reactive to light.   Neck: Neck supple.   Normal range of motion.  Cardiovascular:  Normal rate and normal heart sounds.           Pulmonary/Chest: Breath sounds normal. No respiratory distress.   Abdominal: She exhibits no distension. There is no abdominal tenderness.   Musculoskeletal:         General: Normal range of motion.      Cervical back: Normal range of motion and neck supple.     Neurological: She is alert and oriented to person, place, and time. She has normal strength. No cranial nerve deficit or sensory deficit. GCS score is 15. GCS eye subscore is 4. GCS verbal subscore is 5. GCS motor subscore is 6.   Skin: Skin is warm and dry. Capillary refill takes less than 2 seconds. No rash noted.   Psychiatric: She has a normal mood and affect.         ED Course   Procedures  Labs Reviewed   COMPREHENSIVE METABOLIC PANEL - Abnormal       Result Value    Sodium 137      Potassium 3.6      Chloride 105      CO2 22 (*)     Glucose 101      BUN 9      Creatinine 0.7      Calcium 9.0      Protein Total 8.4      Albumin 3.8      Bilirubin Total 0.3      ALP 79      AST 15      ALT 14      Anion Gap 10      eGFR >60     CBC WITH DIFFERENTIAL - Abnormal    WBC 5.64      RBC 5.10      HGB 10.5 (*)     HCT 34.8 (*)     MCV 68 (*)     MCH 20.6 (*)     MCHC  30.2 (*)     RDW 18.3 (*)     Platelet Count 331      MPV 9.9      Nucleated RBC 0      Neut % 59.7      Lymph % 31.9      Mono % 6.4      Eos % 1.1      Basophil % 0.7      Imm Grans % 0.2      Neut # 3.37      Lymph # 1.80      Mono # 0.36      Eos # 0.06      Baso # 0.04      Imm Grans # 0.01     HEPATITIS C ANTIBODY - Normal    Hep C Ab Interp Negative     HIV 1 / 2 ANTIBODY - Normal    HIV 1/2 Ag/Ab Negative     TROPONIN I - Normal    Troponin-I <0.006     B-TYPE NATRIURETIC PEPTIDE - Normal    BNP 16     D DIMER, QUANTITATIVE - Normal    D-Dimer <0.19     CBC W/ AUTO DIFFERENTIAL    Narrative:     The following orders were created for panel order CBC auto differential.  Procedure                               Abnormality         Status                     ---------                               -----------         ------                     CBC with Differential[[id number]]       Abnormal            Final result                 Please view results for these tests on the individual orders.   HEP C VIRUS HOLD SPECIMEN     EKG Readings: (Independently Interpreted)   Rhythm: Normal Sinus Rhythm. Heart Rate: 69.     ECG Results              EKG 12-lead (In process)        Collection Time Result Time QRS Duration OHS QTC Calculation    07/10/25 11:16:16 07/10/25 13:18:07 84 415                     In process by Interface, Lab In [facility] (07/10/25 13:18:14)                   Narrative:    Test Reason : R07.9,    Vent. Rate :  69 BPM     Atrial Rate :  69 BPM     P-R Int : 148 ms          QRS Dur :  84 ms      QT Int : 388 ms       P-R-T Axes :  47  60  59 degrees    QTcB Int : 415 ms    Normal sinus rhythm  Normal ECG  When compared with ECG of 11-Jul-2016 22:13,  No significant change was found    Referred By: AAAREFERRAL SELF           Confirmed By:                                   Imaging Results              X-Ray Chest AP Portable (Final result)  Result time 07/10/25 12:38:11      Final result by Chris Emerson  MD KRYSTYNA (07/10/25 12:38:11)                   Impression:      No acute process seen.      Electronically signed by: Chris Emerson MD  Date:    07/10/2025  Time:    12:38               Narrative:    EXAMINATION:  XR CHEST AP PORTABLE    CLINICAL HISTORY:  Chest Pain;    FINDINGS:  Single view of the chest.  Comparison 07/11/2016    Cardiac silhouette is normal.  The lungs demonstrate no evidence of active disease.  No evidence of pleural effusion or pneumothorax.  Bones appear intact.  Moderate degenerative changes and moderate atherosclerotic disease.                                       Medications - No data to display  Medical Decision Making  Amount and/or Complexity of Data Reviewed  Labs: ordered.  Radiology: ordered.                   2:27 PM  Patient presents to ER for evaluation of chest pain.  Patient comfortable.  Afebrile.  Nontoxic appearing.  Vital signs stable.  Cardiac workup is negative.  All results discussed with patient.  We will refer to Cardiology for follow up.  Advised to return to ER for new or worsening symptoms.  Patient verbalized understanding and is in agreement with treatment plan.  Stable for discharge.  Differential diagnosis include pneumonia, cardiac arrhythmia, DVT, PE, panic attack, GERD, chest wall strain, radiculopathy, carpal tunnel syndrome.                   Clinical Impression:  Final diagnoses:  [R07.9] Chest pain  [M79.602] Left arm pain (Primary)          ED Disposition Condition    Discharge Stable          ED Prescriptions    None       Follow-up Information       Follow up With Specialties Details Why Contact Info    [facility] CARDIOLOGY Cardiology Schedule an appointment as soon as possible for a visit   [address] [facility] [address] [phone]                   [1]   Social History  Tobacco Use    Smoking status: Never     Passive exposure: Never    Smokeless tobacco: Never   Substance Use Topics    Alcohol use: No    Drug use: No         Isabella Whipple NP  07/11/25 1021

## 2025-07-10 NOTE — DISCHARGE INSTRUCTIONS
Follow up with your primary care provider for further evaluation and management.  Call and schedule appointment with cardiologist for cardiac workup.  A referral was sent for you today.  Return to ER for new or worsening symptoms.

## 2025-07-13 LAB
HOLD SPECIMEN: NORMAL
OHS QRS DURATION: 84 MS
OHS QTC CALCULATION: 415 MS

## 2025-07-25 ENCOUNTER — OFFICE VISIT (OUTPATIENT)
Dept: CARDIOLOGY | Facility: CLINIC | Age: 35
End: 2025-07-25
Payer: COMMERCIAL

## 2025-07-25 VITALS
DIASTOLIC BLOOD PRESSURE: 67 MMHG | HEIGHT: 68 IN | HEART RATE: 75 BPM | OXYGEN SATURATION: 99 % | BODY MASS INDEX: 33.41 KG/M2 | SYSTOLIC BLOOD PRESSURE: 127 MMHG | RESPIRATION RATE: 16 BRPM | WEIGHT: 220.44 LBS

## 2025-07-25 DIAGNOSIS — R07.9 CHEST PAIN: ICD-10-CM

## 2025-07-25 DIAGNOSIS — R07.2 PRECORDIAL PAIN: Primary | ICD-10-CM

## 2025-07-25 DIAGNOSIS — R06.02 SOB (SHORTNESS OF BREATH): ICD-10-CM

## 2025-07-25 PROCEDURE — 99999 PR PBB SHADOW E&M-EST. PATIENT-LVL IV: CPT | Mod: PBBFAC,,, | Performed by: INTERNAL MEDICINE

## 2025-07-25 RX ORDER — MEDROXYPROGESTERONE ACETATE 10 MG/1
10 TABLET ORAL DAILY
COMMUNITY

## 2025-07-25 NOTE — PROGRESS NOTES
Subjective:   Patient ID:  Yee Adler is a 35 y.o. female who presents for follow-up of No chief complaint on file.  Pt with CP and SOB x few weeks.  EKG is normal  Echo at PCP - ? Abnormalities  Sleep apnea test pending  Cardiac risk factors- none    Chest Pain   This is a recurrent problem. The current episode started 1 to 4 weeks ago. The onset quality is gradual. The problem occurs intermittently. The problem has been waxing and waning. The pain is present in the substernal region. The pain is mild. The quality of the pain is described as sharp. The pain radiates to the left arm. Associated symptoms include shortness of breath. Pertinent negatives include no dizziness or palpitations. The pain is aggravated by nothing. She has tried rest for the symptoms. The treatment provided mild relief. There are no known risk factors.   Pertinent negatives for past medical history include no muscle weakness.   Shortness of Breath  This is a recurrent problem. The current episode started 1 to 4 weeks ago. The problem occurs intermittently. The problem has been waxing and waning. Associated symptoms include chest pain. Pertinent negatives include no leg swelling. Nothing aggravates the symptoms. The patient has no known risk factors for DVT/PE. She has tried rest for the symptoms. The treatment provided mild relief.       Review of Systems   Constitutional: Negative. Negative for weight gain.   HENT: Negative.     Eyes: Negative.    Cardiovascular:  Positive for chest pain. Negative for leg swelling and palpitations.   Respiratory:  Positive for shortness of breath.    Endocrine: Negative.    Hematologic/Lymphatic: Negative.    Skin: Negative.    Musculoskeletal:  Negative for muscle weakness.   Gastrointestinal: Negative.    Genitourinary: Negative.    Neurological: Negative.  Negative for dizziness.   Psychiatric/Behavioral: Negative.     Allergic/Immunologic: Negative.    All other systems reviewed and are  negative.    Family History   Problem Relation Name Age of Onset    Stroke Maternal Grandmother      Diabetes Mother      Hypertension Mother      Breast cancer Neg Hx      Cancer Neg Hx      Colon cancer Neg Hx      Eclampsia Neg Hx      Miscarriages / Stillbirths Neg Hx      Ovarian cancer Neg Hx       labor Neg Hx       Past Medical History:   Diagnosis Date    Abdominal pain during pregnancy in third trimester 2023    Anemia 2014    Oligohydramnios in third trimester 2023    Severe pre-eclampsia in third trimester 2023     Social History[1]  Medications Ordered Prior to Encounter[2]  Review of patient's allergies indicates:  No Known Allergies    Objective:     Physical Exam  Vitals and nursing note reviewed.   Constitutional:       Appearance: She is well-developed.   HENT:      Head: Normocephalic and atraumatic.   Eyes:      Conjunctiva/sclera: Conjunctivae normal.      Pupils: Pupils are equal, round, and reactive to light.   Cardiovascular:      Rate and Rhythm: Normal rate and regular rhythm.      Pulses: Intact distal pulses.      Heart sounds: Normal heart sounds.   Pulmonary:      Effort: Pulmonary effort is normal.      Breath sounds: Normal breath sounds.   Abdominal:      General: Bowel sounds are normal.      Palpations: Abdomen is soft.   Musculoskeletal:         General: Normal range of motion.      Cervical back: Normal range of motion and neck supple.   Skin:     General: Skin is warm and dry.   Neurological:      Mental Status: She is alert and oriented to person, place, and time.         Assessment:     1. Precordial pain    2. Chest pain    3. SOB (shortness of breath)        Plan:     Precordial pain    Chest pain  -     Ambulatory referral/consult to Cardiology    SOB (shortness of breath)      Obtain echo report PCP  Stress test         [1]   Social History  Socioeconomic History    Marital status: Single   Tobacco Use    Smoking status: Never     Passive  exposure: Never    Smokeless tobacco: Never   Substance and Sexual Activity    Alcohol use: No    Drug use: No    Sexual activity: Yes     Partners: Male     Birth control/protection: None   [2]   Current Outpatient Medications on File Prior to Visit   Medication Sig Dispense Refill    magnesium oxide (MAG-OX) 400 mg (241.3 mg magnesium) tablet Take 400 mg by mouth once daily.      medroxyPROGESTERone (PROVERA) 10 MG tablet Take 10 mg by mouth once daily.       No current facility-administered medications on file prior to visit.

## 2025-07-31 ENCOUNTER — PATIENT MESSAGE (OUTPATIENT)
Dept: CARDIOLOGY | Facility: HOSPITAL | Age: 35
End: 2025-07-31
Payer: COMMERCIAL

## 2025-07-31 ENCOUNTER — HOSPITAL ENCOUNTER (OUTPATIENT)
Dept: CARDIOLOGY | Facility: HOSPITAL | Age: 35
Discharge: HOME | End: 2025-07-31
Attending: INTERNAL MEDICINE
Payer: COMMERCIAL

## 2025-07-31 DIAGNOSIS — R06.02 SOB (SHORTNESS OF BREATH): ICD-10-CM

## 2025-07-31 DIAGNOSIS — R07.2 PRECORDIAL PAIN: ICD-10-CM

## 2025-07-31 LAB
CV STRESS BASE HR: 107 BPM
DIASTOLIC BLOOD PRESSURE: 77 MMHG
OHS CV CPX 1 MINUTE RECOVERY HEART RATE: 153 BPM
OHS CV CPX 85 PERCENT MAX PREDICTED HEART RATE MALE: 156
OHS CV CPX ESTIMATED METS: 10
OHS CV CPX MAX PREDICTED HEART RATE: 183
OHS CV CPX PATIENT IS FEMALE: 1
OHS CV CPX PATIENT IS MALE: 0
OHS CV CPX PEAK DIASTOLIC BLOOD PRESSURE: 73 MMHG
OHS CV CPX PEAK HEAR RATE: 173 BPM
OHS CV CPX PEAK RATE PRESSURE PRODUCT: NORMAL
OHS CV CPX PEAK SYSTOLIC BLOOD PRESSURE: 190 MMHG
OHS CV CPX PERCENT MAX PREDICTED HEART RATE ACHIEVED: 100
OHS CV CPX RATE PRESSURE PRODUCT PRESENTING: NORMAL
STRESS ECHO POST EXERCISE DUR MIN: 9 MINUTES
STRESS ECHO POST EXERCISE DUR SEC: 1 SECONDS
SYSTOLIC BLOOD PRESSURE: 124 MMHG

## 2025-07-31 PROCEDURE — 93018 CV STRESS TEST I&R ONLY: CPT | Mod: ,,, | Performed by: INTERNAL MEDICINE

## 2025-07-31 PROCEDURE — 93017 CV STRESS TEST TRACING ONLY: CPT

## 2025-07-31 PROCEDURE — 93016 CV STRESS TEST SUPVJ ONLY: CPT | Mod: ,,, | Performed by: INTERNAL MEDICINE

## 2025-08-18 ENCOUNTER — TELEPHONE (OUTPATIENT)
Dept: NEUROLOGY | Facility: CLINIC | Age: 35
End: 2025-08-18
Payer: COMMERCIAL

## (undated) DEVICE — Device

## (undated) DEVICE — DRESSING AQUACEL AG 3.5X10IN